# Patient Record
Sex: FEMALE | Race: WHITE | Employment: UNEMPLOYED | URBAN - METROPOLITAN AREA
[De-identification: names, ages, dates, MRNs, and addresses within clinical notes are randomized per-mention and may not be internally consistent; named-entity substitution may affect disease eponyms.]

---

## 2017-01-30 LAB
ALBUMIN SERPL-MCNC: 4 G/DL
ALP BLD-CCNC: 73 U/L
ALT SERPL-CCNC: 20 U/L
ANION GAP SERPL CALCULATED.3IONS-SCNC: 66 MMOL/L
AST SERPL-CCNC: 20 U/L
BILIRUB SERPL-MCNC: 0.4 MG/DL (ref 0.1–1.4)
BUN BLDV-MCNC: 11 MG/DL
CALCIUM SERPL-MCNC: 9.3 MG/DL
CHLORIDE BLD-SCNC: 102 MMOL/L
CHOLESTEROL, TOTAL: 195 MG/DL
CHOLESTEROL/HDL RATIO: NORMAL
CO2: 28 MMOL/L
CREAT SERPL-MCNC: 0.9 MG/DL
GFR CALCULATED: NORMAL
GLUCOSE BLD-MCNC: 107 MG/DL
HDLC SERPL-MCNC: 42 MG/DL (ref 35–70)
LDL CHOLESTEROL CALCULATED: 120 MG/DL (ref 0–160)
POTASSIUM SERPL-SCNC: 3.9 MMOL/L
SODIUM BLD-SCNC: 143 MMOL/L
TOTAL PROTEIN: 6.6
TRIGL SERPL-MCNC: 164 MG/DL
VLDLC SERPL CALC-MCNC: 33 MG/DL

## 2017-03-27 ENCOUNTER — PROCEDURE VISIT (OUTPATIENT)
Dept: CARDIOLOGY CLINIC | Age: 67
End: 2017-03-27

## 2017-03-27 DIAGNOSIS — Z95.0 CARDIAC PACEMAKER IN SITU: Primary | ICD-10-CM

## 2017-03-27 PROCEDURE — 93294 REM INTERROG EVL PM/LDLS PM: CPT | Performed by: INTERNAL MEDICINE

## 2017-03-27 PROCEDURE — 93296 REM INTERROG EVL PM/IDS: CPT | Performed by: INTERNAL MEDICINE

## 2017-06-09 RX ORDER — ATENOLOL AND CHLORTHALIDONE TABLET 50; 25 MG/1; MG/1
0.5 TABLET ORAL DAILY
Qty: 45 TABLET | Refills: 3 | Status: SHIPPED | OUTPATIENT
Start: 2017-06-09 | End: 2017-08-24 | Stop reason: SDUPTHER

## 2017-07-05 ENCOUNTER — PROCEDURE VISIT (OUTPATIENT)
Dept: CARDIOLOGY CLINIC | Age: 67
End: 2017-07-05

## 2017-07-05 DIAGNOSIS — Z95.0 CARDIAC PACEMAKER IN SITU: Primary | ICD-10-CM

## 2017-07-05 PROCEDURE — 93294 REM INTERROG EVL PM/LDLS PM: CPT | Performed by: INTERNAL MEDICINE

## 2017-07-05 PROCEDURE — 93296 REM INTERROG EVL PM/IDS: CPT | Performed by: INTERNAL MEDICINE

## 2017-08-24 ENCOUNTER — OFFICE VISIT (OUTPATIENT)
Dept: CARDIOLOGY CLINIC | Age: 67
End: 2017-08-24

## 2017-08-24 VITALS
SYSTOLIC BLOOD PRESSURE: 130 MMHG | WEIGHT: 234.4 LBS | DIASTOLIC BLOOD PRESSURE: 90 MMHG | HEART RATE: 68 BPM | BODY MASS INDEX: 37.67 KG/M2 | HEIGHT: 66 IN

## 2017-08-24 DIAGNOSIS — I47.1 PSVT (PAROXYSMAL SUPRAVENTRICULAR TACHYCARDIA) (HCC): Primary | ICD-10-CM

## 2017-08-24 DIAGNOSIS — Z95.0 PACEMAKER: ICD-10-CM

## 2017-08-24 DIAGNOSIS — I87.2 VENOUS INSUFFICIENCY: ICD-10-CM

## 2017-08-24 DIAGNOSIS — E78.2 MIXED HYPERLIPIDEMIA: ICD-10-CM

## 2017-08-24 DIAGNOSIS — I10 ESSENTIAL HYPERTENSION: ICD-10-CM

## 2017-08-24 PROCEDURE — 1090F PRES/ABSN URINE INCON ASSESS: CPT | Performed by: INTERNAL MEDICINE

## 2017-08-24 PROCEDURE — 99214 OFFICE O/P EST MOD 30 MIN: CPT | Performed by: INTERNAL MEDICINE

## 2017-08-24 PROCEDURE — 1123F ACP DISCUSS/DSCN MKR DOCD: CPT | Performed by: INTERNAL MEDICINE

## 2017-08-24 PROCEDURE — 4040F PNEUMOC VAC/ADMIN/RCVD: CPT | Performed by: INTERNAL MEDICINE

## 2017-08-24 PROCEDURE — 3014F SCREEN MAMMO DOC REV: CPT | Performed by: INTERNAL MEDICINE

## 2017-08-24 PROCEDURE — 93000 ELECTROCARDIOGRAM COMPLETE: CPT | Performed by: INTERNAL MEDICINE

## 2017-08-24 PROCEDURE — G8427 DOCREV CUR MEDS BY ELIG CLIN: HCPCS | Performed by: INTERNAL MEDICINE

## 2017-08-24 PROCEDURE — G8419 CALC BMI OUT NRM PARAM NOF/U: HCPCS | Performed by: INTERNAL MEDICINE

## 2017-08-24 PROCEDURE — 1036F TOBACCO NON-USER: CPT | Performed by: INTERNAL MEDICINE

## 2017-08-24 PROCEDURE — G8400 PT W/DXA NO RESULTS DOC: HCPCS | Performed by: INTERNAL MEDICINE

## 2017-08-24 PROCEDURE — 3017F COLORECTAL CA SCREEN DOC REV: CPT | Performed by: INTERNAL MEDICINE

## 2017-08-24 RX ORDER — VERAPAMIL HYDROCHLORIDE 120 MG/1
120 TABLET, FILM COATED ORAL DAILY
Qty: 90 TABLET | Refills: 3 | Status: SHIPPED | OUTPATIENT
Start: 2017-08-24 | End: 2019-03-13 | Stop reason: SDUPTHER

## 2017-08-24 RX ORDER — PRAVASTATIN SODIUM 80 MG/1
TABLET ORAL
Qty: 90 TABLET | Refills: 3 | Status: SHIPPED | OUTPATIENT
Start: 2017-08-24 | End: 2018-08-29 | Stop reason: SDUPTHER

## 2017-08-24 RX ORDER — MELOXICAM 15 MG/1
15 TABLET ORAL DAILY
COMMUNITY
End: 2018-08-29 | Stop reason: ALTCHOICE

## 2017-08-24 RX ORDER — ATENOLOL AND CHLORTHALIDONE TABLET 50; 25 MG/1; MG/1
0.5 TABLET ORAL DAILY
Qty: 45 TABLET | Refills: 3 | Status: SHIPPED | OUTPATIENT
Start: 2017-08-24 | End: 2018-08-29 | Stop reason: SDUPTHER

## 2017-09-21 ENCOUNTER — NURSE ONLY (OUTPATIENT)
Dept: CARDIOLOGY CLINIC | Age: 67
End: 2017-09-21

## 2017-09-21 VITALS
BODY MASS INDEX: 38.09 KG/M2 | HEIGHT: 66 IN | DIASTOLIC BLOOD PRESSURE: 68 MMHG | OXYGEN SATURATION: 97 % | SYSTOLIC BLOOD PRESSURE: 118 MMHG | HEART RATE: 59 BPM | WEIGHT: 237 LBS

## 2017-09-21 DIAGNOSIS — I10 ESSENTIAL HYPERTENSION: Primary | ICD-10-CM

## 2017-09-21 PROCEDURE — 99999 PR OFFICE/OUTPT VISIT,PROCEDURE ONLY: CPT | Performed by: INTERNAL MEDICINE

## 2017-10-09 ENCOUNTER — PROCEDURE VISIT (OUTPATIENT)
Dept: CARDIOLOGY CLINIC | Age: 67
End: 2017-10-09

## 2017-10-09 DIAGNOSIS — Z95.0 CARDIAC PACEMAKER IN SITU: Primary | ICD-10-CM

## 2017-10-09 PROCEDURE — 93296 REM INTERROG EVL PM/IDS: CPT | Performed by: INTERNAL MEDICINE

## 2017-10-09 PROCEDURE — 93294 REM INTERROG EVL PM/LDLS PM: CPT | Performed by: INTERNAL MEDICINE

## 2017-10-19 LAB
A/G RATIO: 1.5 (CALC) (ref 0.8–2.6)
ALBUMIN SERPL-MCNC: 4.2 GM/DL (ref 3.5–5.2)
ALP BLD-CCNC: 71 U/L (ref 23–144)
ALT SERPL-CCNC: 25 U/L (ref 0–60)
AST SERPL-CCNC: 22 U/L (ref 0–55)
BILIRUB SERPL-MCNC: 0.3 MG/DL (ref 0–1.2)
BUN / CREAT RATIO: 16 (CALC) (ref 7–25)
BUN BLDV-MCNC: 16 MG/DL (ref 3–29)
CALCIUM SERPL-MCNC: 9.3 MG/DL (ref 8.5–10.5)
CHLORIDE BLD-SCNC: 100 MEQ/L (ref 96–110)
CHOLESTEROL, TOTAL: 161 MG/DL
CO2: 28 MEQ/L (ref 19–32)
COPY(IES) SENT TO:: NORMAL
CREAT SERPL-MCNC: 1 MG/DL
GFR SERPL CREATININE-BSD FRML MDRD: 58 ML/MIN/1.73M2
GLOBULIN: 2.8 GM/DL (CALC) (ref 1.9–3.6)
GLUCOSE BLD-MCNC: 107 MG/DL
HDLC SERPL-MCNC: 44 MG/DL
LDL CHOLESTEROL: 79 MG/DL (CALC)
POTASSIUM SERPL-SCNC: 3.8 MEQ/L (ref 3.4–5.3)
SODIUM BLD-SCNC: 141 MEQ/L (ref 135–148)
TOTAL PROTEIN: 7 GM/DL (ref 6–8.3)
TRIGL SERPL-MCNC: 192 MG/DL
VLDLC SERPL CALC-MCNC: 38 MG/DL (CALC) (ref 4–38)

## 2018-01-15 ENCOUNTER — PROCEDURE VISIT (OUTPATIENT)
Dept: CARDIOLOGY CLINIC | Age: 68
End: 2018-01-15

## 2018-01-15 ENCOUNTER — TELEPHONE (OUTPATIENT)
Dept: CARDIOLOGY CLINIC | Age: 68
End: 2018-01-15

## 2018-01-15 DIAGNOSIS — Z95.0 CARDIAC PACEMAKER IN SITU: Primary | ICD-10-CM

## 2018-01-15 PROCEDURE — 93294 REM INTERROG EVL PM/LDLS PM: CPT | Performed by: INTERNAL MEDICINE

## 2018-01-15 PROCEDURE — 93296 REM INTERROG EVL PM/IDS: CPT | Performed by: INTERNAL MEDICINE

## 2018-02-23 RX ORDER — FLECAINIDE ACETATE 150 MG/1
150 TABLET ORAL 2 TIMES DAILY
Qty: 180 TABLET | Refills: 3 | Status: SHIPPED | OUTPATIENT
Start: 2018-02-23 | End: 2019-03-13 | Stop reason: SDUPTHER

## 2018-03-01 ENCOUNTER — TELEPHONE (OUTPATIENT)
Dept: CARDIOLOGY CLINIC | Age: 68
End: 2018-03-01

## 2018-03-08 ENCOUNTER — TELEPHONE (OUTPATIENT)
Dept: CARDIOLOGY CLINIC | Age: 68
End: 2018-03-08

## 2018-03-08 NOTE — TELEPHONE ENCOUNTER
Patient called stating flecainide is generic at $175.00. Dr. Alyce Longoria said she may need to shop around it is cheaper other places.

## 2018-04-24 ENCOUNTER — PROCEDURE VISIT (OUTPATIENT)
Dept: CARDIOLOGY CLINIC | Age: 68
End: 2018-04-24

## 2018-04-24 DIAGNOSIS — Z95.0 CARDIAC PACEMAKER IN SITU: Primary | ICD-10-CM

## 2018-04-24 PROCEDURE — 93296 REM INTERROG EVL PM/IDS: CPT | Performed by: INTERNAL MEDICINE

## 2018-04-24 PROCEDURE — 93294 REM INTERROG EVL PM/LDLS PM: CPT | Performed by: INTERNAL MEDICINE

## 2018-07-30 ENCOUNTER — PROCEDURE VISIT (OUTPATIENT)
Dept: CARDIOLOGY CLINIC | Age: 68
End: 2018-07-30

## 2018-07-30 DIAGNOSIS — Z95.0 CARDIAC PACEMAKER IN SITU: Primary | ICD-10-CM

## 2018-07-30 PROCEDURE — 93296 REM INTERROG EVL PM/IDS: CPT | Performed by: INTERNAL MEDICINE

## 2018-07-30 PROCEDURE — 93294 REM INTERROG EVL PM/LDLS PM: CPT | Performed by: INTERNAL MEDICINE

## 2018-08-29 ENCOUNTER — OFFICE VISIT (OUTPATIENT)
Dept: CARDIOLOGY CLINIC | Age: 68
End: 2018-08-29

## 2018-08-29 VITALS
WEIGHT: 228.6 LBS | DIASTOLIC BLOOD PRESSURE: 88 MMHG | HEART RATE: 60 BPM | SYSTOLIC BLOOD PRESSURE: 138 MMHG | BODY MASS INDEX: 36.74 KG/M2 | HEIGHT: 66 IN

## 2018-08-29 DIAGNOSIS — Z95.0 PACEMAKER: Primary | ICD-10-CM

## 2018-08-29 DIAGNOSIS — I10 ESSENTIAL HYPERTENSION: ICD-10-CM

## 2018-08-29 DIAGNOSIS — I47.1 PSVT (PAROXYSMAL SUPRAVENTRICULAR TACHYCARDIA) (HCC): ICD-10-CM

## 2018-08-29 DIAGNOSIS — E78.2 MIXED HYPERLIPIDEMIA: ICD-10-CM

## 2018-08-29 PROCEDURE — 1090F PRES/ABSN URINE INCON ASSESS: CPT | Performed by: INTERNAL MEDICINE

## 2018-08-29 PROCEDURE — 93000 ELECTROCARDIOGRAM COMPLETE: CPT | Performed by: INTERNAL MEDICINE

## 2018-08-29 PROCEDURE — 3017F COLORECTAL CA SCREEN DOC REV: CPT | Performed by: INTERNAL MEDICINE

## 2018-08-29 PROCEDURE — 99214 OFFICE O/P EST MOD 30 MIN: CPT | Performed by: INTERNAL MEDICINE

## 2018-08-29 PROCEDURE — G8427 DOCREV CUR MEDS BY ELIG CLIN: HCPCS | Performed by: INTERNAL MEDICINE

## 2018-08-29 PROCEDURE — G8400 PT W/DXA NO RESULTS DOC: HCPCS | Performed by: INTERNAL MEDICINE

## 2018-08-29 PROCEDURE — 1123F ACP DISCUSS/DSCN MKR DOCD: CPT | Performed by: INTERNAL MEDICINE

## 2018-08-29 PROCEDURE — 1036F TOBACCO NON-USER: CPT | Performed by: INTERNAL MEDICINE

## 2018-08-29 PROCEDURE — 1101F PT FALLS ASSESS-DOCD LE1/YR: CPT | Performed by: INTERNAL MEDICINE

## 2018-08-29 PROCEDURE — 4040F PNEUMOC VAC/ADMIN/RCVD: CPT | Performed by: INTERNAL MEDICINE

## 2018-08-29 PROCEDURE — G8417 CALC BMI ABV UP PARAM F/U: HCPCS | Performed by: INTERNAL MEDICINE

## 2018-08-29 RX ORDER — ALLOPURINOL 300 MG/1
300 TABLET ORAL DAILY
COMMUNITY

## 2018-08-29 RX ORDER — OMEPRAZOLE 40 MG/1
40 CAPSULE, DELAYED RELEASE ORAL DAILY
COMMUNITY

## 2018-08-29 RX ORDER — BIOTIN 1 MG
1 TABLET ORAL DAILY
COMMUNITY
End: 2019-09-18

## 2018-08-29 RX ORDER — ATENOLOL AND CHLORTHALIDONE TABLET 50; 25 MG/1; MG/1
0.5 TABLET ORAL DAILY
Qty: 45 TABLET | Refills: 3 | Status: SHIPPED | OUTPATIENT
Start: 2018-08-29 | End: 2019-03-13 | Stop reason: SDUPTHER

## 2018-08-29 RX ORDER — NAPROXEN 500 MG/1
500 TABLET ORAL PRN
COMMUNITY

## 2018-08-29 RX ORDER — PRAVASTATIN SODIUM 80 MG/1
TABLET ORAL
Qty: 90 TABLET | Refills: 3 | Status: SHIPPED | OUTPATIENT
Start: 2018-08-29 | End: 2019-03-13 | Stop reason: SDUPTHER

## 2018-11-06 ENCOUNTER — PROCEDURE VISIT (OUTPATIENT)
Dept: CARDIOLOGY CLINIC | Age: 68
End: 2018-11-06
Payer: MEDICARE

## 2018-11-06 DIAGNOSIS — Z95.0 CARDIAC PACEMAKER IN SITU: Primary | ICD-10-CM

## 2018-11-06 PROCEDURE — 93294 REM INTERROG EVL PM/LDLS PM: CPT | Performed by: INTERNAL MEDICINE

## 2018-11-06 PROCEDURE — 93296 REM INTERROG EVL PM/IDS: CPT | Performed by: INTERNAL MEDICINE

## 2019-02-11 ENCOUNTER — TELEPHONE (OUTPATIENT)
Dept: CARDIOLOGY CLINIC | Age: 69
End: 2019-02-11

## 2019-02-11 ENCOUNTER — PROCEDURE VISIT (OUTPATIENT)
Dept: CARDIOLOGY CLINIC | Age: 69
End: 2019-02-11
Payer: MEDICARE

## 2019-02-11 DIAGNOSIS — Z95.0 CARDIAC PACEMAKER IN SITU: Primary | ICD-10-CM

## 2019-02-11 PROCEDURE — 93294 REM INTERROG EVL PM/LDLS PM: CPT | Performed by: INTERNAL MEDICINE

## 2019-02-11 PROCEDURE — 93296 REM INTERROG EVL PM/IDS: CPT | Performed by: INTERNAL MEDICINE

## 2019-02-12 LAB
ALBUMIN SERPL-MCNC: 4.6 G/DL
ALP BLD-CCNC: NORMAL U/L
ALT SERPL-CCNC: NORMAL U/L
ANION GAP SERPL CALCULATED.3IONS-SCNC: NORMAL MMOL/L
AST SERPL-CCNC: NORMAL U/L
BILIRUB SERPL-MCNC: NORMAL MG/DL (ref 0.1–1.4)
BUN BLDV-MCNC: 14 MG/DL
CALCIUM SERPL-MCNC: 9.8 MG/DL
CHLORIDE BLD-SCNC: 101 MMOL/L
CHOLESTEROL, TOTAL: 141 MG/DL
CHOLESTEROL/HDL RATIO: NORMAL
CO2: 32 MMOL/L
CREAT SERPL-MCNC: 1 MG/DL
GFR CALCULATED: NORMAL
GLUCOSE BLD-MCNC: 111 MG/DL
HDLC SERPL-MCNC: 44 MG/DL (ref 35–70)
LDL CHOLESTEROL CALCULATED: 67 MG/DL (ref 0–160)
POTASSIUM SERPL-SCNC: 3.4 MMOL/L
SODIUM BLD-SCNC: 142 MMOL/L
T4 FREE: 1.3
TOTAL PROTEIN: 6.8
TRIGL SERPL-MCNC: 149 MG/DL
TSH SERPL DL<=0.05 MIU/L-ACNC: 2.9 UIU/ML
URIC ACID: 5.8
VLDLC SERPL CALC-MCNC: 30 MG/DL

## 2019-03-13 ENCOUNTER — OFFICE VISIT (OUTPATIENT)
Dept: CARDIOLOGY CLINIC | Age: 69
End: 2019-03-13
Payer: MEDICARE

## 2019-03-13 VITALS
HEIGHT: 66 IN | SYSTOLIC BLOOD PRESSURE: 150 MMHG | WEIGHT: 234.8 LBS | HEART RATE: 64 BPM | DIASTOLIC BLOOD PRESSURE: 84 MMHG | BODY MASS INDEX: 37.73 KG/M2

## 2019-03-13 DIAGNOSIS — I10 ESSENTIAL HYPERTENSION: ICD-10-CM

## 2019-03-13 DIAGNOSIS — I20.8 ANGINA OF EFFORT (HCC): ICD-10-CM

## 2019-03-13 DIAGNOSIS — E78.2 MIXED HYPERLIPIDEMIA: ICD-10-CM

## 2019-03-13 DIAGNOSIS — I47.1 PSVT (PAROXYSMAL SUPRAVENTRICULAR TACHYCARDIA) (HCC): ICD-10-CM

## 2019-03-13 DIAGNOSIS — Z95.0 PACEMAKER: Primary | ICD-10-CM

## 2019-03-13 PROBLEM — I20.89 ANGINA OF EFFORT: Status: ACTIVE | Noted: 2019-03-13

## 2019-03-13 PROCEDURE — 4040F PNEUMOC VAC/ADMIN/RCVD: CPT | Performed by: INTERNAL MEDICINE

## 2019-03-13 PROCEDURE — 1101F PT FALLS ASSESS-DOCD LE1/YR: CPT | Performed by: INTERNAL MEDICINE

## 2019-03-13 PROCEDURE — G8484 FLU IMMUNIZE NO ADMIN: HCPCS | Performed by: INTERNAL MEDICINE

## 2019-03-13 PROCEDURE — G8417 CALC BMI ABV UP PARAM F/U: HCPCS | Performed by: INTERNAL MEDICINE

## 2019-03-13 PROCEDURE — 1036F TOBACCO NON-USER: CPT | Performed by: INTERNAL MEDICINE

## 2019-03-13 PROCEDURE — 1090F PRES/ABSN URINE INCON ASSESS: CPT | Performed by: INTERNAL MEDICINE

## 2019-03-13 PROCEDURE — G8427 DOCREV CUR MEDS BY ELIG CLIN: HCPCS | Performed by: INTERNAL MEDICINE

## 2019-03-13 PROCEDURE — 3017F COLORECTAL CA SCREEN DOC REV: CPT | Performed by: INTERNAL MEDICINE

## 2019-03-13 PROCEDURE — G8400 PT W/DXA NO RESULTS DOC: HCPCS | Performed by: INTERNAL MEDICINE

## 2019-03-13 PROCEDURE — 99214 OFFICE O/P EST MOD 30 MIN: CPT | Performed by: INTERNAL MEDICINE

## 2019-03-13 PROCEDURE — 1123F ACP DISCUSS/DSCN MKR DOCD: CPT | Performed by: INTERNAL MEDICINE

## 2019-03-13 PROCEDURE — G8599 NO ASA/ANTIPLAT THER USE RNG: HCPCS | Performed by: INTERNAL MEDICINE

## 2019-03-13 RX ORDER — FLECAINIDE ACETATE 150 MG/1
150 TABLET ORAL 2 TIMES DAILY
Qty: 180 TABLET | Refills: 2 | Status: SHIPPED | OUTPATIENT
Start: 2019-03-13 | End: 2020-01-22 | Stop reason: SDUPTHER

## 2019-03-13 RX ORDER — VERAPAMIL HYDROCHLORIDE 120 MG/1
120 TABLET, FILM COATED ORAL 2 TIMES DAILY
Qty: 180 TABLET | Refills: 2 | Status: SHIPPED | OUTPATIENT
Start: 2019-03-13 | End: 2019-06-13

## 2019-03-13 RX ORDER — ATENOLOL AND CHLORTHALIDONE TABLET 50; 25 MG/1; MG/1
0.5 TABLET ORAL DAILY
Qty: 45 TABLET | Refills: 2 | Status: SHIPPED | OUTPATIENT
Start: 2019-03-13 | End: 2020-01-22 | Stop reason: SDUPTHER

## 2019-03-13 RX ORDER — PRAVASTATIN SODIUM 80 MG/1
TABLET ORAL
Qty: 90 TABLET | Refills: 2 | Status: SHIPPED | OUTPATIENT
Start: 2019-03-13 | End: 2020-01-22 | Stop reason: SDUPTHER

## 2019-03-13 RX ORDER — TURMERIC/TURMERIC EXT/PEPR EXT 900-100 MG
CAPSULE ORAL
COMMUNITY
End: 2019-09-18

## 2019-03-15 ENCOUNTER — TELEPHONE (OUTPATIENT)
Dept: CARDIOLOGY CLINIC | Age: 69
End: 2019-03-15

## 2019-03-19 ENCOUNTER — PROCEDURE VISIT (OUTPATIENT)
Dept: CARDIOLOGY CLINIC | Age: 69
End: 2019-03-19
Payer: MEDICARE

## 2019-03-19 DIAGNOSIS — I20.8 ANGINA OF EFFORT (HCC): ICD-10-CM

## 2019-03-19 DIAGNOSIS — R06.02 SHORTNESS OF BREATH: Primary | ICD-10-CM

## 2019-03-19 LAB
LV EF: 64 %
LVEF MODALITY: NORMAL

## 2019-03-19 PROCEDURE — 93018 CV STRESS TEST I&R ONLY: CPT | Performed by: INTERNAL MEDICINE

## 2019-03-19 PROCEDURE — 93017 CV STRESS TEST TRACING ONLY: CPT | Performed by: INTERNAL MEDICINE

## 2019-03-19 PROCEDURE — A9500 TC99M SESTAMIBI: HCPCS | Performed by: INTERNAL MEDICINE

## 2019-03-19 PROCEDURE — 78452 HT MUSCLE IMAGE SPECT MULT: CPT | Performed by: INTERNAL MEDICINE

## 2019-03-19 PROCEDURE — 93016 CV STRESS TEST SUPVJ ONLY: CPT | Performed by: INTERNAL MEDICINE

## 2019-03-20 ENCOUNTER — TELEPHONE (OUTPATIENT)
Dept: CARDIOLOGY CLINIC | Age: 69
End: 2019-03-20

## 2019-03-20 NOTE — TELEPHONE ENCOUNTER
Patient called she was called with normal test results   She wants to know what the next step is   She is worried about the feeling she is having   Feels like the oxygen is being pulled from  The back of her shoulder blades

## 2019-03-21 NOTE — TELEPHONE ENCOUNTER
I spoke to patient and discussed the symptoms. Her symptoms are vague and lasting only for a few seconds and are unpredictable. I advised her to follow-up with primary care physician to see if she could be referred to a neurologist for neuro workup as some of these symptoms she is having could be related to neuropathy related paresthesias.

## 2019-05-10 ENCOUNTER — TELEPHONE (OUTPATIENT)
Dept: CARDIOLOGY CLINIC | Age: 69
End: 2019-05-10

## 2019-05-16 ENCOUNTER — TELEPHONE (OUTPATIENT)
Dept: CARDIOLOGY CLINIC | Age: 69
End: 2019-05-16

## 2019-05-16 RX ORDER — AMLODIPINE BESYLATE 5 MG/1
5 TABLET ORAL DAILY
Qty: 90 TABLET | Refills: 1 | Status: SHIPPED | OUTPATIENT
Start: 2019-05-16 | End: 2019-09-18 | Stop reason: SDUPTHER

## 2019-05-16 NOTE — TELEPHONE ENCOUNTER
Pt called about [facility] script not being at pharmacy. Spoke with hardeep and she has verified that the dr beauchamp finished his side of script. She will go to him and remind him. Pt understood that it should be there tonight and will pick it up on Saturday. No call needed to pt.

## 2019-05-20 ENCOUNTER — PROCEDURE VISIT (OUTPATIENT)
Dept: CARDIOLOGY CLINIC | Age: 69
End: 2019-05-20
Payer: MEDICARE

## 2019-05-20 DIAGNOSIS — Z95.0 CARDIAC PACEMAKER IN SITU: Primary | ICD-10-CM

## 2019-05-20 PROCEDURE — 93294 REM INTERROG EVL PM/LDLS PM: CPT | Performed by: INTERNAL MEDICINE

## 2019-05-20 PROCEDURE — 93296 REM INTERROG EVL PM/IDS: CPT | Performed by: INTERNAL MEDICINE

## 2019-06-13 ENCOUNTER — NURSE ONLY (OUTPATIENT)
Dept: CARDIOLOGY CLINIC | Age: 69
End: 2019-06-13

## 2019-06-13 VITALS
DIASTOLIC BLOOD PRESSURE: 82 MMHG | SYSTOLIC BLOOD PRESSURE: 142 MMHG | WEIGHT: 237.6 LBS | HEIGHT: 66 IN | BODY MASS INDEX: 38.18 KG/M2 | HEART RATE: 62 BPM

## 2019-06-13 DIAGNOSIS — I10 ESSENTIAL HYPERTENSION: Primary | ICD-10-CM

## 2019-08-27 ENCOUNTER — PROCEDURE VISIT (OUTPATIENT)
Dept: CARDIOLOGY CLINIC | Age: 69
End: 2019-08-27
Payer: MEDICARE

## 2019-08-27 DIAGNOSIS — Z95.0 CARDIAC PACEMAKER IN SITU: Primary | ICD-10-CM

## 2019-08-27 PROCEDURE — 93296 REM INTERROG EVL PM/IDS: CPT | Performed by: INTERNAL MEDICINE

## 2019-08-27 PROCEDURE — 93294 REM INTERROG EVL PM/LDLS PM: CPT | Performed by: INTERNAL MEDICINE

## 2019-09-18 ENCOUNTER — OFFICE VISIT (OUTPATIENT)
Dept: CARDIOLOGY CLINIC | Age: 69
End: 2019-09-18
Payer: MEDICARE

## 2019-09-18 VITALS
HEIGHT: 66 IN | DIASTOLIC BLOOD PRESSURE: 86 MMHG | SYSTOLIC BLOOD PRESSURE: 136 MMHG | BODY MASS INDEX: 38.57 KG/M2 | HEART RATE: 60 BPM | WEIGHT: 240 LBS

## 2019-09-18 DIAGNOSIS — Z95.0 PACEMAKER: ICD-10-CM

## 2019-09-18 DIAGNOSIS — E78.2 MIXED HYPERLIPIDEMIA: Primary | ICD-10-CM

## 2019-09-18 DIAGNOSIS — I10 ESSENTIAL HYPERTENSION: ICD-10-CM

## 2019-09-18 PROCEDURE — 99214 OFFICE O/P EST MOD 30 MIN: CPT | Performed by: INTERNAL MEDICINE

## 2019-09-18 PROCEDURE — 1123F ACP DISCUSS/DSCN MKR DOCD: CPT | Performed by: INTERNAL MEDICINE

## 2019-09-18 PROCEDURE — 3017F COLORECTAL CA SCREEN DOC REV: CPT | Performed by: INTERNAL MEDICINE

## 2019-09-18 PROCEDURE — 1036F TOBACCO NON-USER: CPT | Performed by: INTERNAL MEDICINE

## 2019-09-18 PROCEDURE — 1090F PRES/ABSN URINE INCON ASSESS: CPT | Performed by: INTERNAL MEDICINE

## 2019-09-18 PROCEDURE — 4040F PNEUMOC VAC/ADMIN/RCVD: CPT | Performed by: INTERNAL MEDICINE

## 2019-09-18 PROCEDURE — G8427 DOCREV CUR MEDS BY ELIG CLIN: HCPCS | Performed by: INTERNAL MEDICINE

## 2019-09-18 PROCEDURE — G8417 CALC BMI ABV UP PARAM F/U: HCPCS | Performed by: INTERNAL MEDICINE

## 2019-09-18 PROCEDURE — G8400 PT W/DXA NO RESULTS DOC: HCPCS | Performed by: INTERNAL MEDICINE

## 2019-09-18 PROCEDURE — G8599 NO ASA/ANTIPLAT THER USE RNG: HCPCS | Performed by: INTERNAL MEDICINE

## 2019-09-18 RX ORDER — AMLODIPINE BESYLATE 5 MG/1
5 TABLET ORAL DAILY
Qty: 90 TABLET | Refills: 3 | Status: SHIPPED | OUTPATIENT
Start: 2019-09-18 | End: 2020-01-22 | Stop reason: SDUPTHER

## 2019-09-18 NOTE — PROGRESS NOTES
MG tablet Take 1 tablet by mouth daily 9/18/19  Yes Author MD Rommel   atenolol-chlorthalidone [facility]) 50-25 MG per tablet Take 0.5 tablets by mouth daily 3/13/19  Yes Author MD Rommel   pravastatin (PRAVACHOL) 80 MG tablet One daily 3/13/19  Yes Author MD Rommel   flecainide [facility] [place]) 150 MG tablet Take 1 tablet by mouth 2 times daily 3/13/19  Yes Author MD Rommel   omeprazole (PRILOSEC) 40 MG delayed release capsule Take 40 mg by mouth daily   Yes Historical Provider, MD   naproxen (NAPROSYN) 500 MG tablet Take 500 mg by mouth as needed for Pain   Yes Historical Provider, MD   allopurinol (ZYLOPRIM) 300 MG tablet Take 300 mg by mouth daily   Yes Historical Provider, MD   Multiple Vitamin (MULTIVITAMIN PO) Take  by mouth daily. Yes Historical Provider, MD     Vitals:    09/18/19 1151   BP: 136/86   Pulse: 60   Weight: 240 lb (108.9 kg)   Height: 5' 6\" (1.676 m)      Body mass index is 38.74 kg/m². Wt Readings from Last 3 Encounters:   09/18/19 240 lb (108.9 kg)   06/13/19 237 lb 9.6 oz (107.8 kg)   03/13/19 234 lb 12.8 oz (106.5 kg)     Constitutional: Patient is moderately obese pleasant female in no apparent distress. Weight is up by 6 pounds since last visit. Eyes: Pupils are equal in both eyes.  She wears glasses. NECK: No JVP or thyromegaly  Cardiovascular:  Auscultation: Normal S1 and S2.  No significant murmurs. Carotids negative for bruits.  Abdominal aorta is nonpalpable.  No epigastric bruit noted. Peripheral pulses: 1+ equal in both feet. Respiratory:  Respiratory effort is normal. Breath sounds are clear to auscultation. Extremities:  No edema, clubbing,  Cyanosis, petechiae. SKIN: Warm and well perfused, no pallor or cyanosis  Abdomen:  No masses or tenderness. No organomegaly noted. Musculoskeletal:  No spinal deformities noted.  Gait is normal.  Muscle strength is normal.  Left-sided pacemaker pocket is intact.     Neurologic:  Oriented to time, place, and person and

## 2019-09-18 NOTE — PATIENT INSTRUCTIONS
Bring the BP monitor for nurse check. Continue current medications. Salt restriction. Counseled to lose weight. Continue device check as per care link schedule. Appropriate prescriptions if needed on this visit are addressed. After visit summery is provided. Questions answered and patient verbalizes understanding. Follow up in office in 6 months, sooner if needed.

## 2019-10-16 ENCOUNTER — OFFICE VISIT (OUTPATIENT)
Dept: CARDIOLOGY CLINIC | Age: 69
End: 2019-10-16

## 2019-10-16 VITALS
DIASTOLIC BLOOD PRESSURE: 88 MMHG | HEART RATE: 67 BPM | BODY MASS INDEX: 38.7 KG/M2 | WEIGHT: 240.8 LBS | SYSTOLIC BLOOD PRESSURE: 138 MMHG | HEIGHT: 66 IN

## 2019-10-16 DIAGNOSIS — I10 ESSENTIAL HYPERTENSION: Primary | ICD-10-CM

## 2019-12-05 ENCOUNTER — PROCEDURE VISIT (OUTPATIENT)
Dept: CARDIOLOGY CLINIC | Age: 69
End: 2019-12-05
Payer: MEDICARE

## 2019-12-05 DIAGNOSIS — Z95.0 CARDIAC PACEMAKER IN SITU: Primary | ICD-10-CM

## 2019-12-05 PROCEDURE — 93296 REM INTERROG EVL PM/IDS: CPT | Performed by: INTERNAL MEDICINE

## 2019-12-05 PROCEDURE — 93294 REM INTERROG EVL PM/LDLS PM: CPT | Performed by: INTERNAL MEDICINE

## 2020-01-14 LAB
BASOPHILS ABSOLUTE: 0 /ΜL
BASOPHILS RELATIVE PERCENT: 0 %
BUN BLDV-MCNC: 16 MG/DL
CALCIUM SERPL-MCNC: 9.7 MG/DL
CHLORIDE BLD-SCNC: 101 MMOL/L
CHOLESTEROL, TOTAL: 166 MG/DL
CHOLESTEROL/HDL RATIO: NORMAL
CO2: 26 MMOL/L
CREAT SERPL-MCNC: 1.06 MG/DL
EOSINOPHILS ABSOLUTE: 0.3 /ΜL
EOSINOPHILS RELATIVE PERCENT: 4 %
GFR CALCULATED: NORMAL
GLUCOSE BLD-MCNC: 121 MG/DL
HCT VFR BLD CALC: 40.7 % (ref 36–46)
HDLC SERPL-MCNC: 44 MG/DL (ref 35–70)
HEMOGLOBIN: 13.5 G/DL (ref 12–16)
LDL CHOLESTEROL CALCULATED: 88 MG/DL (ref 0–160)
LYMPHOCYTES ABSOLUTE: 1.7 /ΜL
LYMPHOCYTES RELATIVE PERCENT: 22 %
MCH RBC QN AUTO: 29.8 PG
MCHC RBC AUTO-ENTMCNC: 33.2 G/DL
MCV RBC AUTO: 90 FL
MONOCYTES ABSOLUTE: 0.4 /ΜL
MONOCYTES RELATIVE PERCENT: 6 %
NEUTROPHILS ABSOLUTE: 5.2 /ΜL
NEUTROPHILS RELATIVE PERCENT: 68 %
PLATELET # BLD: 343 K/ΜL
PMV BLD AUTO: NORMAL FL
POTASSIUM SERPL-SCNC: 3.8 MMOL/L
RBC # BLD: 4.53 10^6/ΜL
SODIUM BLD-SCNC: 142 MMOL/L
T4 FREE: 1.22
TRIGL SERPL-MCNC: 172 MG/DL
TSH SERPL DL<=0.05 MIU/L-ACNC: 2.98 UIU/ML
VLDLC SERPL CALC-MCNC: 34 MG/DL
WBC # BLD: 7.6 10^3/ML

## 2020-01-15 LAB
CHOLESTEROL, TOTAL: 166 MG/DL
CHOLESTEROL/HDL RATIO: NORMAL
HDLC SERPL-MCNC: 44 MG/DL (ref 35–70)
LDL CHOLESTEROL CALCULATED: 88 MG/DL (ref 0–160)
TRIGL SERPL-MCNC: 172 MG/DL
VLDLC SERPL CALC-MCNC: 34 MG/DL

## 2020-01-22 RX ORDER — ATENOLOL AND CHLORTHALIDONE TABLET 50; 25 MG/1; MG/1
0.5 TABLET ORAL DAILY
Qty: 45 TABLET | Refills: 3 | Status: SHIPPED | OUTPATIENT
Start: 2020-01-22 | End: 2021-01-13 | Stop reason: SDUPTHER

## 2020-01-22 RX ORDER — PRAVASTATIN SODIUM 80 MG/1
TABLET ORAL
Qty: 90 TABLET | Refills: 3 | Status: SHIPPED | OUTPATIENT
Start: 2020-01-22 | End: 2021-05-18 | Stop reason: SDUPTHER

## 2020-01-22 RX ORDER — FLECAINIDE ACETATE 150 MG/1
150 TABLET ORAL 2 TIMES DAILY
Qty: 180 TABLET | Refills: 3 | Status: SHIPPED | OUTPATIENT
Start: 2020-01-22 | End: 2020-12-17 | Stop reason: SDUPTHER

## 2020-01-22 RX ORDER — AMLODIPINE BESYLATE 5 MG/1
5 TABLET ORAL DAILY
Qty: 90 TABLET | Refills: 3 | Status: SHIPPED | OUTPATIENT
Start: 2020-01-22 | End: 2020-06-25 | Stop reason: SDUPTHER

## 2020-01-22 RX ORDER — FLECAINIDE ACETATE 150 MG/1
150 TABLET ORAL 2 TIMES DAILY
Qty: 20 TABLET | Refills: 0 | Status: SHIPPED
Start: 2020-01-22 | End: 2020-06-25 | Stop reason: SDUPTHER

## 2020-01-22 NOTE — TELEPHONE ENCOUNTER
Change pharm to [facility] [phone] pt is out of all. Needs ASAP was called to [facility] [address]. Pt will call them and have them give her one week.

## 2020-01-27 ENCOUNTER — TELEPHONE (OUTPATIENT)
Dept: CARDIOLOGY CLINIC | Age: 70
End: 2020-01-27

## 2020-03-10 ENCOUNTER — PROCEDURE VISIT (OUTPATIENT)
Dept: CARDIOLOGY CLINIC | Age: 70
End: 2020-03-10
Payer: MEDICARE

## 2020-03-10 ENCOUNTER — TELEPHONE (OUTPATIENT)
Dept: CARDIOLOGY CLINIC | Age: 70
End: 2020-03-10

## 2020-03-10 PROCEDURE — 93296 REM INTERROG EVL PM/IDS: CPT | Performed by: INTERNAL MEDICINE

## 2020-03-10 PROCEDURE — 93294 REM INTERROG EVL PM/LDLS PM: CPT | Performed by: INTERNAL MEDICINE

## 2020-03-23 ENCOUNTER — TELEPHONE (OUTPATIENT)
Dept: CARDIOLOGY CLINIC | Age: 70
End: 2020-03-23

## 2020-03-23 NOTE — TELEPHONE ENCOUNTER
Returned call and advised patient that as of her last check in march 2020 she has 4 years left on her pacemaker battery. She thanked me for calling her back.

## 2020-06-17 ENCOUNTER — PROCEDURE VISIT (OUTPATIENT)
Dept: CARDIOLOGY CLINIC | Age: 70
End: 2020-06-17
Payer: COMMERCIAL

## 2020-06-17 PROCEDURE — 93294 REM INTERROG EVL PM/LDLS PM: CPT | Performed by: INTERNAL MEDICINE

## 2020-06-17 PROCEDURE — 93296 REM INTERROG EVL PM/IDS: CPT | Performed by: INTERNAL MEDICINE

## 2020-06-25 ENCOUNTER — TELEMEDICINE (OUTPATIENT)
Dept: CARDIOLOGY CLINIC | Age: 70
End: 2020-06-25
Payer: COMMERCIAL

## 2020-06-25 PROCEDURE — 99214 OFFICE O/P EST MOD 30 MIN: CPT | Performed by: INTERNAL MEDICINE

## 2020-06-25 RX ORDER — AMLODIPINE BESYLATE 10 MG/1
10 TABLET ORAL DAILY
Qty: 90 TABLET | Refills: 3 | Status: SHIPPED | OUTPATIENT
Start: 2020-06-25 | End: 2021-01-13 | Stop reason: SDUPTHER

## 2020-06-25 NOTE — PROGRESS NOTES
2020    TELEHEALTH EVALUATION -- Audio/Visual (During GCZ-12 public health emergency)    HPI:  Della Sims (:  1950) has requested an audio/video evaluation for hypertension hyperlipidemia and she has permanent pacemaker. She denies any chest pains. She had swelling in her feet. She denies any chest pain or shortness of breath. Denies any palpitations syncope or near-syncope. Her blood pressure has been running high and PCP has increased amlodipine to 10 mg a day and it is doing better. She has been compliant to her medications. She does not smoke. Prior to Visit Medications    Medication Sig Taking? Authorizing Provider   amLODIPine (NORVASC) 10 MG tablet Take 1 tablet by mouth daily Yes Fay Closs, MD   atenolol-chlorthalidone (TENORETIC) 50-25 MG per tablet Take 0.5 tablets by mouth daily Yes Fay Closs, MD   flecainide (TAMBOCOR) 150 MG tablet Take 1 tablet by mouth 2 times daily Yes Fay Closs, MD   pravastatin (PRAVACHOL) 80 MG tablet One daily Yes Fay Closs, MD   omeprazole (PRILOSEC) 40 MG delayed release capsule Take 40 mg by mouth daily Yes Historical Provider, MD   naproxen (NAPROSYN) 500 MG tablet Take 500 mg by mouth as needed for Pain Yes Historical Provider, MD   allopurinol (ZYLOPRIM) 300 MG tablet Take 300 mg by mouth daily Yes Historical Provider, MD   Multiple Vitamin (MULTIVITAMIN PO) Take  by mouth daily.    Yes Historical Provider, MD     Social History     Tobacco Use    Smoking status: Never Smoker    Smokeless tobacco: Never Used   Substance Use Topics    Alcohol use: No     Alcohol/week: 0.0 standard drinks     PHYSICAL EXAMINATION:  [ INSTRUCTIONS:  \"[x]\" Indicates a positive item  \"[]\" Indicates a negative item  -- DELETE ALL ITEMS NOT EXAMINED]  Vital Signs: (As obtained by patient/caregiver or practitioner observation)  Patient-Reported Vitals 2020   Patient-Reported Systolic 133   Patient-Reported Diastolic 74   Patient-Reported

## 2020-07-22 ENCOUNTER — TELEPHONE (OUTPATIENT)
Dept: CARDIOLOGY CLINIC | Age: 70
End: 2020-07-22

## 2020-07-23 ENCOUNTER — PROCEDURE VISIT (OUTPATIENT)
Dept: CARDIOLOGY CLINIC | Age: 70
End: 2020-07-23
Payer: COMMERCIAL

## 2020-07-23 PROCEDURE — 93970 EXTREMITY STUDY: CPT | Performed by: INTERNAL MEDICINE

## 2020-07-24 ENCOUNTER — TELEPHONE (OUTPATIENT)
Dept: CARDIOLOGY CLINIC | Age: 70
End: 2020-07-24

## 2020-07-24 NOTE — TELEPHONE ENCOUNTER
Spoke with patient regarding testing results. Patient voiced understanding.      Conclusions          Summary          No evidence of significant venous insufficiency noted in the bilateral lower     extremities .     No evidence of DVT or SVT in the bilateral common femoral vein, femoral     vein, popliteal vein, greater saphenous vein or small saphenous vein.          Signature

## 2020-09-20 PROCEDURE — 93294 REM INTERROG EVL PM/LDLS PM: CPT | Performed by: INTERNAL MEDICINE

## 2020-09-20 PROCEDURE — 93296 REM INTERROG EVL PM/IDS: CPT | Performed by: INTERNAL MEDICINE

## 2020-09-22 ENCOUNTER — PROCEDURE VISIT (OUTPATIENT)
Dept: CARDIOLOGY CLINIC | Age: 70
End: 2020-09-22
Payer: COMMERCIAL

## 2020-12-17 RX ORDER — FLECAINIDE ACETATE 150 MG/1
150 TABLET ORAL 2 TIMES DAILY
Qty: 180 TABLET | Refills: 3 | Status: SHIPPED | OUTPATIENT
Start: 2020-12-17 | End: 2021-03-19 | Stop reason: SDUPTHER

## 2020-12-22 RX ORDER — FLECAINIDE ACETATE 150 MG/1
150 TABLET ORAL 2 TIMES DAILY
Qty: 180 TABLET | Refills: 3 | OUTPATIENT
Start: 2020-12-22

## 2021-01-08 ENCOUNTER — PROCEDURE VISIT (OUTPATIENT)
Dept: CARDIOLOGY CLINIC | Age: 71
End: 2021-01-08
Payer: COMMERCIAL

## 2021-01-08 ENCOUNTER — TELEPHONE (OUTPATIENT)
Dept: CARDIOLOGY CLINIC | Age: 71
End: 2021-01-08

## 2021-01-08 DIAGNOSIS — Z95.0 CARDIAC PACEMAKER IN SITU: Primary | ICD-10-CM

## 2021-01-08 PROCEDURE — 93294 REM INTERROG EVL PM/LDLS PM: CPT | Performed by: INTERNAL MEDICINE

## 2021-01-08 PROCEDURE — 93296 REM INTERROG EVL PM/IDS: CPT | Performed by: INTERNAL MEDICINE

## 2021-01-08 NOTE — LETTER
Jaquan [address]  [address] [address]  Phone: [phone]  Fax: [phone]            January 8, 2021    [place] [id number]      Dear Johanna Reyes: This is your CARELINK schedule. Please edilma your calendar with these dates. You can do your checks anytime during the scheduled day. Since we do not do reminder calls, it will be your responsibility to perform the checks on the day it is scheduled. If you have any questions or concerns, please call and ask for Tacos Davis  at [phone].

## 2021-01-13 ENCOUNTER — OFFICE VISIT (OUTPATIENT)
Dept: CARDIOLOGY CLINIC | Age: 71
End: 2021-01-13
Payer: MEDICARE

## 2021-01-13 VITALS
WEIGHT: 237 LBS | DIASTOLIC BLOOD PRESSURE: 86 MMHG | HEART RATE: 73 BPM | HEIGHT: 66 IN | BODY MASS INDEX: 38.09 KG/M2 | SYSTOLIC BLOOD PRESSURE: 137 MMHG

## 2021-01-13 DIAGNOSIS — E78.00 PURE HYPERCHOLESTEROLEMIA: ICD-10-CM

## 2021-01-13 DIAGNOSIS — I10 ESSENTIAL HYPERTENSION: ICD-10-CM

## 2021-01-13 DIAGNOSIS — Z95.0 PACEMAKER: ICD-10-CM

## 2021-01-13 DIAGNOSIS — I47.1 PSVT (PAROXYSMAL SUPRAVENTRICULAR TACHYCARDIA) (HCC): ICD-10-CM

## 2021-01-13 PROCEDURE — 99214 OFFICE O/P EST MOD 30 MIN: CPT | Performed by: INTERNAL MEDICINE

## 2021-01-13 RX ORDER — AMLODIPINE BESYLATE 10 MG/1
10 TABLET ORAL DAILY
Qty: 90 TABLET | Refills: 3 | Status: SHIPPED | OUTPATIENT
Start: 2021-01-13 | End: 2022-01-20 | Stop reason: SDUPTHER

## 2021-01-13 RX ORDER — ATENOLOL AND CHLORTHALIDONE TABLET 50; 25 MG/1; MG/1
0.5 TABLET ORAL DAILY
Qty: 45 TABLET | Refills: 3 | Status: SHIPPED | OUTPATIENT
Start: 2021-01-13 | End: 2021-12-27

## 2021-01-13 NOTE — PROGRESS NOTES
Madelene Mohs (:  1950) is a 69 y.o. female,     Patient is here for further evaluation and follow up for hypertension and hyperlipidemia and the history of paroxysmal supraventricular tachyarrhythmia on flecainide therapy status post permanent pacemaker implantation. She denies any cardiac symptoms. She is active at home does not exercise and not able to go to [facility] due to poor 19. Her mother who was 8-year-old in nursing home passed away recently due to Coumadin 19 but she did not have much exposure since she was infected. Medications are reviewed and she has been compliant to her medications. She does not smoke. Allergies   Allergen Reactions    Sulfa Antibiotics Hives     Prior to Admission medications    Medication Sig Start Date End Date Taking? Authorizing Provider   atenolol-chlorthalidone (TENORETIC) 50-25 MG per tablet Take 0.5 tablets by mouth daily 21  Yes Zelda Peabody, MD   amLODIPine (NORVASC) 10 MG tablet Take 1 tablet by mouth daily 21  Yes Zelda Peabody, MD   flecainide [facility] [place]) 150 MG tablet Take 1 tablet by mouth 2 times daily 20  Yes Zelda Peabody, MD   pravastatin (PRAVACHOL) 80 MG tablet One daily 20  Yes Zelda Peabody, MD   omeprazole (PRILOSEC) 40 MG delayed release capsule Take 40 mg by mouth daily   Yes Historical Provider, MD   naproxen (NAPROSYN) 500 MG tablet Take 500 mg by mouth as needed for Pain   Yes Historical Provider, MD   allopurinol (ZYLOPRIM) 300 MG tablet Take 300 mg by mouth daily   Yes Historical Provider, MD   Multiple Vitamin (MULTIVITAMIN PO) Take  by mouth daily.      Yes Historical Provider, MD     Past Medical History:   Diagnosis Date    Abnormal Holter monitor finding     2003-Symptomatic Sick Sinus Syndrome    Cardiac arrhythmia     symptomatic SV arrhythmias    DVT (deep venous thrombosis) (HCC)     post right knee replacement/IVC filter 2012    Family history of cardiac dysrhythmia     Father    H/O cardiovascular stress test     10/2007-Post stress myocardial perfusion images show a normal pattern of perfusion in all regions. Post stress LV normal in size. This is normal Perfusion Cardiolite study. EF70%. Global LV systolic function normal. Exercise capacity 9 METS. Exercise capacity is normal.     H/O cardiovascular stress test 03/19/2019    EF 64%, Breast attenuation otherwise normal perfusion in distribution of all coronaries.  H/O chest x-ray 9/27/2011    Nonacute    H/O echocardiogram     12/2003, 8/1993    Hiatal hernia with gastroesophageal reflux     History of complete ECG     5/17/2012, 9/26/2011,3/11/2011,9/23/2009,9/5/2007,1/21/2004    History of sick sinus syndrome     HX OTHER MEDICAL     MEDTRONIC CARELINK    Hyperlipidemia     Hypertension     Obesity     Pacemaker     9/29/2011-Battery replacement- 1/26/2004- Repositioning of lead; 12/12/2003-Medtronic-Dual chamber Medtronic Model , serial [id number] ;    Venous insufficiency 8/24/2017      Vitals:    01/13/21 1150   BP: 137/86   Pulse: 73   Weight: 237 lb (107.5 kg)   Height: 5' 6\" (1.676 m)      Body mass index is 38.25 kg/m². Wt Readings from Last 3 Encounters:   01/13/21 237 lb (107.5 kg)   10/16/19 240 lb 12.8 oz (109.2 kg)   09/18/19 240 lb (108.9 kg)     Constitutional:  Patient is moderately overweight female in no apparent distress. HEENT: She is wearing glasses and facemask. Cardiovascular: Auscultation: Normal S1 and S2. No murmurs noted. Carotids are negative for bruits. Abdominal aorta is palpable. No epigastric bruit noted. Peripheral pulses: 2+ plus. Pacemaker pocket is intact. Respiratory:  Respiratory effort is normal. Breath sounds are clear to auscultation. Extremities:  No pitting edema, clubbing,  Cyanosis, petechiae. Abdomen:  No masses or tenderness. No organomegaly noted. Neurologic:  Oriented to time, place, and person and non-anxious.  No focal neurological deficit noted.  Psychiatric: Normal mood and effect. Pertinent records reviewed and discussed with patient and results are as follow:    Pacer analysis done on the December 27, 2020 is reviewed is consistent with normal dual-chamber non-MRI Medtronic pacer function with stable leads and appropriate battery status for the age of the device. Remaining average battery life is 3.5 years. Device is programmed to DDD mode lower rate of 60 and 68% sensing in both atrium and ventricle and 30% pacing in the atrium and sensing in the ventricle. Lab Results   Component Value Date    WBC 7.6 01/14/2020    HGB 13.5 01/14/2020    HCT 40.7 01/14/2020     01/14/2020     Lab Results   Component Value Date    CHOL 166 01/15/2020    TRIG 172 01/15/2020    HDL 44 01/15/2020    LDLCALC 88 01/15/2020     Lab Results   Component Value Date    BUN 16 01/14/2020    CREATININE 1.06 01/14/2020     01/14/2020    K 3.8 01/14/2020     Lab Results   Component Value Date    INR 2.06 11/06/2012     ASSESSMENT/PLAN:    1. Pacemaker  Assessment & Plan:  Working well with the remaining longevity of 3.5 years. We'll continue to monitor as per care link schedule. Patient is not pacer dependent. 2. Pure hypercholesterolemia  Assessment & Plan:  Fairly well controlled with the last LDL of 88 last year. She is due to have it checked next week again. Continue current statin therapy. 3. Essential hypertension  Assessment & Plan:  Well-controlled on current medications continue the same. 4. PSVT (paroxysmal supraventricular tachycardia) ([facility])  Assessment & Plan:  Well-controlled on flecainide and she is tolerating it well. Continue the same. Will do EKG on follow-up visit for QTc. Continue device check as per care link schedule. Continue current cardiovascular medications which have been reviewed and discussed individually with you. Continue to lose weight. Follow-up in 6 months with EKG, sooner if needed.   We have discussed

## 2021-01-13 NOTE — ASSESSMENT & PLAN NOTE
Well-controlled on flecainide and she is tolerating it well. Continue the same. Will do EKG on follow-up visit for QTc.

## 2021-01-13 NOTE — ASSESSMENT & PLAN NOTE
Working well with the remaining longevity of 3.5 years. We'll continue to monitor as per care link schedule. Patient is not pacer dependent.

## 2021-03-19 RX ORDER — FLECAINIDE ACETATE 150 MG/1
150 TABLET ORAL 2 TIMES DAILY
Qty: 180 TABLET | Refills: 3 | Status: SHIPPED | OUTPATIENT
Start: 2021-03-19 | End: 2022-01-20 | Stop reason: SDUPTHER

## 2021-04-13 ENCOUNTER — PROCEDURE VISIT (OUTPATIENT)
Dept: CARDIOLOGY CLINIC | Age: 71
End: 2021-04-13
Payer: MEDICARE

## 2021-04-13 DIAGNOSIS — Z95.0 CARDIAC PACEMAKER IN SITU: Primary | ICD-10-CM

## 2021-04-13 PROCEDURE — 93296 REM INTERROG EVL PM/IDS: CPT | Performed by: INTERNAL MEDICINE

## 2021-04-13 PROCEDURE — 93294 REM INTERROG EVL PM/LDLS PM: CPT | Performed by: INTERNAL MEDICINE

## 2021-05-19 RX ORDER — PRAVASTATIN SODIUM 80 MG/1
TABLET ORAL
Qty: 90 TABLET | Refills: 3 | Status: SHIPPED | OUTPATIENT
Start: 2021-05-19 | End: 2022-01-20 | Stop reason: SDUPTHER

## 2021-06-11 ENCOUNTER — TELEPHONE (OUTPATIENT)
Dept: CARDIOLOGY CLINIC | Age: 71
End: 2021-06-11

## 2021-06-11 NOTE — TELEPHONE ENCOUNTER
Finn called with a allergy notice with   atenolol-chlorthalidone   Patient has a   Noted sulfa allergy , she needs to know if its  Ok to fill  Ref # [id number]

## 2021-07-14 ENCOUNTER — OFFICE VISIT (OUTPATIENT)
Dept: CARDIOLOGY CLINIC | Age: 71
End: 2021-07-14
Payer: MEDICARE

## 2021-07-14 VITALS
HEIGHT: 66 IN | HEART RATE: 64 BPM | BODY MASS INDEX: 34.07 KG/M2 | SYSTOLIC BLOOD PRESSURE: 136 MMHG | WEIGHT: 212 LBS | DIASTOLIC BLOOD PRESSURE: 86 MMHG

## 2021-07-14 DIAGNOSIS — E78.2 MIXED HYPERLIPIDEMIA: ICD-10-CM

## 2021-07-14 DIAGNOSIS — Z95.0 PACEMAKER: Primary | ICD-10-CM

## 2021-07-14 DIAGNOSIS — I47.1 PSVT (PAROXYSMAL SUPRAVENTRICULAR TACHYCARDIA) (HCC): ICD-10-CM

## 2021-07-14 DIAGNOSIS — I10 ESSENTIAL HYPERTENSION: ICD-10-CM

## 2021-07-14 PROCEDURE — 99214 OFFICE O/P EST MOD 30 MIN: CPT | Performed by: INTERNAL MEDICINE

## 2021-07-14 RX ORDER — FLUTICASONE PROPIONATE 50 MCG
1 SPRAY, SUSPENSION (ML) NASAL DAILY
COMMUNITY

## 2021-07-14 RX ORDER — AMOXICILLIN 500 MG
CAPSULE ORAL
COMMUNITY

## 2021-07-14 RX ORDER — MULTIVIT WITH MINERALS/LUTEIN
250 TABLET ORAL DAILY
COMMUNITY

## 2021-07-14 RX ORDER — PHENOL 1.4 %
1 AEROSOL, SPRAY (ML) MUCOUS MEMBRANE DAILY
COMMUNITY

## 2021-07-14 NOTE — PROGRESS NOTES
80 mg daily. 3. Essential hypertension  Assessment & Plan:  Blood pressure is well controlled on Tenoretic and amlodipine. Continue both. 4. PSVT (paroxysmal supraventricular tachycardia) ([facility])  Assessment & Plan:  Had not had much symptomatic episodes lately. Monitor clinically as well as through pacer interrogations. Continue current cardiovascular medications which have been reviewed and discussed individually with you. Continue to lose weight. Continue device check as per care link schedule. Follow-up in 6 months, sooner if needed. An electronic signature was used to authenticate this note.     --Amy Varela MD

## 2021-07-14 NOTE — PATIENT INSTRUCTIONS
Continue current cardiovascular medications which have been reviewed and discussed individually with you. Continue to lose weight. Continue device check as per care link schedule. Follow-up in 6 months, sooner if needed.

## 2021-07-14 NOTE — ASSESSMENT & PLAN NOTE
Last check in April this year reported remaining device battery life of 3 years. She is not dependent on pacemaker. Continue pacer check as per care link schedule.

## 2021-07-14 NOTE — ASSESSMENT & PLAN NOTE
Lipids are reviewed in care everywhere from January this year and compared with the one from last year and discussed with patient. Overall they have improved. .  Continue pravastatin 80 mg daily.

## 2021-07-14 NOTE — ASSESSMENT & PLAN NOTE
Had not had much symptomatic episodes lately. Monitor clinically as well as through pacer interrogations.

## 2021-07-19 ENCOUNTER — PROCEDURE VISIT (OUTPATIENT)
Dept: CARDIOLOGY CLINIC | Age: 71
End: 2021-07-19
Payer: MEDICARE

## 2021-07-19 DIAGNOSIS — Z95.0 CARDIAC PACEMAKER IN SITU: Primary | ICD-10-CM

## 2021-07-19 DIAGNOSIS — I49.5 SINUS NODE DYSFUNCTION (HCC): ICD-10-CM

## 2021-07-19 PROCEDURE — 93296 REM INTERROG EVL PM/IDS: CPT | Performed by: INTERNAL MEDICINE

## 2021-07-19 PROCEDURE — 93294 REM INTERROG EVL PM/LDLS PM: CPT | Performed by: INTERNAL MEDICINE

## 2021-09-28 ENCOUNTER — TELEPHONE (OUTPATIENT)
Dept: CARDIOLOGY CLINIC | Age: 71
End: 2021-09-28

## 2021-09-28 NOTE — TELEPHONE ENCOUNTER
Patient called stating that her next scheduled ppm check is on 10/24 but she will be on vacation, she would like to do it the following week. If that is a problem, please give her a call.

## 2021-10-27 ENCOUNTER — TELEPHONE (OUTPATIENT)
Dept: CARDIOLOGY CLINIC | Age: 71
End: 2021-10-27

## 2021-11-02 ENCOUNTER — TELEPHONE (OUTPATIENT)
Dept: CARDIOLOGY CLINIC | Age: 71
End: 2021-11-02

## 2021-11-02 ENCOUNTER — PROCEDURE VISIT (OUTPATIENT)
Dept: CARDIOLOGY CLINIC | Age: 71
End: 2021-11-02
Payer: MEDICARE

## 2021-11-02 DIAGNOSIS — I49.5 SINUS NODE DYSFUNCTION (HCC): ICD-10-CM

## 2021-11-02 DIAGNOSIS — Z95.0 CARDIAC PACEMAKER IN SITU: Primary | ICD-10-CM

## 2021-11-02 PROCEDURE — 93296 REM INTERROG EVL PM/IDS: CPT | Performed by: INTERNAL MEDICINE

## 2021-11-02 PROCEDURE — 93294 REM INTERROG EVL PM/LDLS PM: CPT | Performed by: INTERNAL MEDICINE

## 2021-11-02 NOTE — LETTER
Jaquan [address]  [address] [place] [address]  Phone: [phone]  Fax: [phone]            November 2, 2021    [facility] [address]      Dear Mahamed Martinez: This is your CARELINK schedule. Please edilma your calendar with these dates. You can do your checks anytime during the scheduled day. Since we do not do reminder calls, it will be your responsibility to perform the checks on the day it is scheduled. If you have any questions or concerns, please call and ask for Dedra Davies at [phone].

## 2021-12-27 RX ORDER — ATENOLOL AND CHLORTHALIDONE TABLET 50; 25 MG/1; MG/1
TABLET ORAL
Qty: 45 TABLET | Refills: 3 | Status: SHIPPED | OUTPATIENT
Start: 2021-12-27

## 2022-01-20 ENCOUNTER — OFFICE VISIT (OUTPATIENT)
Dept: CARDIOLOGY CLINIC | Age: 72
End: 2022-01-20
Payer: MEDICARE

## 2022-01-20 VITALS
HEIGHT: 66 IN | WEIGHT: 223.8 LBS | DIASTOLIC BLOOD PRESSURE: 82 MMHG | HEART RATE: 68 BPM | SYSTOLIC BLOOD PRESSURE: 134 MMHG | BODY MASS INDEX: 35.97 KG/M2

## 2022-01-20 DIAGNOSIS — Z95.0 PACEMAKER: Primary | ICD-10-CM

## 2022-01-20 DIAGNOSIS — I10 PRIMARY HYPERTENSION: ICD-10-CM

## 2022-01-20 DIAGNOSIS — E78.00 PURE HYPERCHOLESTEROLEMIA: ICD-10-CM

## 2022-01-20 DIAGNOSIS — I47.1 PSVT (PAROXYSMAL SUPRAVENTRICULAR TACHYCARDIA) (HCC): ICD-10-CM

## 2022-01-20 PROCEDURE — 99214 OFFICE O/P EST MOD 30 MIN: CPT | Performed by: INTERNAL MEDICINE

## 2022-01-20 RX ORDER — FLECAINIDE ACETATE 150 MG/1
150 TABLET ORAL 2 TIMES DAILY
Qty: 180 TABLET | Refills: 3 | Status: SHIPPED | OUTPATIENT
Start: 2022-01-20

## 2022-01-20 RX ORDER — AMLODIPINE BESYLATE 10 MG/1
10 TABLET ORAL DAILY
Qty: 90 TABLET | Refills: 3 | Status: SHIPPED | OUTPATIENT
Start: 2022-01-20

## 2022-01-20 RX ORDER — PRAVASTATIN SODIUM 80 MG/1
TABLET ORAL
Qty: 90 TABLET | Refills: 3 | Status: SHIPPED | OUTPATIENT
Start: 2022-01-20

## 2022-01-20 NOTE — PATIENT INSTRUCTIONS
Continue device check as per care link schedule. Continue current cardiovascular medications which have been reviewed and discussed individually with you. Follow-up in 6 months, sooner if needed.

## 2022-01-20 NOTE — PROGRESS NOTES
Elena Salguero (:  1950) is a 66 y.o. female,     Chief Complaint   Patient presents with    6 Month Follow-Up     no new cardiac sx, no future procedures, does not exercise much because of covid. Patient is here for follow up for hypertension and hyperlipidemia and sick sinus syndrome status post permanent pacemaker implantation. She denies any cardiac symptoms. She has gained weight as she is not as active as she was before because of her right hip pain for which she is seeing Dr. Dirk Nicholson. She is status post bilateral knee replacement. She is compliant to her medications and she is fully vaccinated against COVID-19. Allergies   Allergen Reactions    Sulfa Antibiotics Hives     Prior to Admission medications    Medication Sig Start Date End Date Taking?  Authorizing Provider   pravastatin (PRAVACHOL) 80 MG tablet One daily 22  Yes Corbin Chaparro MD   flecainide [facility] [place]) 150 MG tablet Take 1 tablet by mouth 2 times daily 22  Yes Corbin Chaparro MD   amLODIPine (NORVASC) 10 MG tablet Take 1 tablet by mouth daily 22  Yes Corbin Chaparro MD   atenolol-chlorthalidone (TENORETIC) 50-25 MG per tablet TAKE 1/2 TABLET DAILY 21  Yes Corbin Chaparro MD   BIOTIN PO Take by mouth   Yes Historical Provider, MD   fluticasone (FLONASE) 50 MCG/ACT nasal spray 1 spray by Each Nostril route daily   Yes Historical Provider, MD   Omega-3 Fatty Acids (FISH OIL) 1200 MG CAPS Take by mouth   Yes Historical Provider, MD   vitamin D (CHOLECALCIFEROL) 25 MCG (1000 UT) TABS tablet Take 1,000 Units by mouth daily   Yes Historical Provider, MD   calcium carbonate 600 MG TABS tablet Take 1 tablet by mouth daily   Yes Historical Provider, MD   Ascorbic Acid (VITAMIN C) 250 MG tablet Take 250 mg by mouth daily   Yes Historical Provider, MD   omeprazole (PRILOSEC) 40 MG delayed release capsule Take 40 mg by mouth daily   Yes Historical Provider, MD   naproxen (NAPROSYN) 500 MG tablet Take 500 mg by mouth as needed for Pain   Yes Historical Provider, MD   allopurinol (ZYLOPRIM) 300 MG tablet Take 300 mg by mouth daily   Yes Historical Provider, MD   Multiple Vitamin (MULTIVITAMIN PO) Take  by mouth daily. Yes Historical Provider, MD     Past Medical History:   Diagnosis Date    Abnormal Holter monitor finding     11/2003-Symptomatic Sick Sinus Syndrome    Cardiac arrhythmia     symptomatic SV arrhythmias    DVT (deep venous thrombosis) (HCC)     post right knee replacement/IVC filter 5/2012    Family history of cardiac dysrhythmia     Father    H/O cardiovascular stress test     10/2007-Post stress myocardial perfusion images show a normal pattern of perfusion in all regions. Post stress LV normal in size. This is normal Perfusion Cardiolite study. EF70%. Global LV systolic function normal. Exercise capacity 9 METS. Exercise capacity is normal.     H/O cardiovascular stress test 03/19/2019    EF 64%, Breast attenuation otherwise normal perfusion in distribution of all coronaries.  H/O chest x-ray 9/27/2011    Nonacute    H/O echocardiogram     12/2003, 8/1993    Hiatal hernia with gastroesophageal reflux     History of complete ECG     5/17/2012, 9/26/2011,3/11/2011,9/23/2009,9/5/2007,1/21/2004    History of sick sinus syndrome     HX OTHER MEDICAL     MEDTRONIC CARELINK    Hyperlipidemia     Hypertension     Obesity     Pacemaker     9/29/2011-Battery replacement- 1/26/2004- Repositioning of lead; 12/12/2003-Medtronic-Dual chamber Medtronic Model , serial [id number] ;    Venous insufficiency 8/24/2017      Vitals:    01/20/22 1100   BP: 134/82   Pulse: 68   Weight: 223 lb 12.8 oz (101.5 kg)   Height: 5' 6\" (1.676 m)      Body mass index is 36.12 kg/m². Wt Readings from Last 3 Encounters:   01/20/22 223 lb 12.8 oz (101.5 kg)   07/14/21 212 lb (96.2 kg)   01/13/21 237 lb (107.5 kg)     Constitutional:  Patient is moderately obese female in no apparent distress.   HEENT: Wearing glasses and facemask. Cardiovascular: Auscultation: Normal S1 and S2. No significant murmurs or gallops noted. Respiratory:  Respiratory effort is normal. Breath sounds are clear to auscultation. Extremities:  No edema, clubbing,  Cyanosis, petechiae. Abdomen:  No masses or tenderness. No organomegaly noted. Neurologic:  Oriented to time, place, and person and non-anxious. No focal neurological deficit noted. She walks with a significant limp due to right hip arthritis. Psychiatric: Normal mood and effect. Pacer analysis from 10/31/2021 is reviewed is consistent with normal dual-chamber non-MRI Medtronic pacer function with stable leads and appropriate battery status for the age of the device. Remaining average battery life is 3 years. Device is programmed to DDD mode lower rate of 60 bpm and 61% sensing in both atrium and ventricle and 29% pacing in the atrium sensing in the ventricle. Pertinent records reviewed and discussed with patient and results are as follow:    Lab Results   Component Value Date    WBC 7.6 01/14/2020    HGB 13.5 01/14/2020    HCT 40.7 01/14/2020     01/14/2020     Lab Results   Component Value Date    CHOL 166 01/15/2020    TRIG 172 01/15/2020    HDL 44 01/15/2020    LDLCALC 88 01/15/2020   Lipid panel from January 18, 2021 in care everywhere reported total cholesterol 153 triglycerides 161 HDL 45 and LDL 76.  Lab Results   Component Value Date    BUN 16 01/14/2020    CREATININE 1.06 01/14/2020     01/14/2020    K 3.8 01/14/2020     Lab Results   Component Value Date    INR 2.06 11/06/2012     ASSESSMENT/PLAN:    1. Pacemaker  Assessment & Plan:  Working well with remaining battery longevity of 3 years. We will continue to monitor as per remote CareLink monitoring and patient is not dependent on it. 2. Primary hypertension  Assessment & Plan:  Well-controlled on Tenoretic half a pill daily and amlodipine. Continue the same.    3. Pure hypercholesterolemia  Assessment & Plan:  Last LDL reported was 76 last year. Continue pravastatin 80 mg daily   4. PSVT (paroxysmal supraventricular tachycardia) (HCC)  Assessment & Plan:  Controlled on flecainide 150 mg daily. Continue the same. Continue device check as per care link schedule. Continue current cardiovascular medications which have been reviewed and discussed individually with you. Follow-up in 6 months with EKG, sooner if needed. An electronic signature was used to authenticate this note.     --April Rosenthal MD

## 2022-01-20 NOTE — ASSESSMENT & PLAN NOTE
Working well with remaining battery longevity of 3 years. We will continue to monitor as per remote CareLink monitoring and patient is not dependent on it.

## 2022-05-09 ENCOUNTER — PROCEDURE VISIT (OUTPATIENT)
Dept: CARDIOLOGY CLINIC | Age: 72
End: 2022-05-09
Payer: MEDICARE

## 2022-05-09 DIAGNOSIS — I49.5 SINUS NODE DYSFUNCTION (HCC): ICD-10-CM

## 2022-05-09 DIAGNOSIS — Z95.0 CARDIAC PACEMAKER IN SITU: Primary | ICD-10-CM

## 2022-05-09 PROCEDURE — 93294 REM INTERROG EVL PM/LDLS PM: CPT | Performed by: INTERNAL MEDICINE

## 2022-05-09 PROCEDURE — 93296 REM INTERROG EVL PM/IDS: CPT | Performed by: INTERNAL MEDICINE

## 2022-07-20 ENCOUNTER — OFFICE VISIT (OUTPATIENT)
Dept: CARDIOLOGY CLINIC | Age: 72
End: 2022-07-20
Payer: MEDICARE

## 2022-07-20 VITALS
SYSTOLIC BLOOD PRESSURE: 134 MMHG | WEIGHT: 230.8 LBS | DIASTOLIC BLOOD PRESSURE: 82 MMHG | HEIGHT: 66 IN | HEART RATE: 62 BPM | BODY MASS INDEX: 37.09 KG/M2

## 2022-07-20 DIAGNOSIS — E78.00 PURE HYPERCHOLESTEROLEMIA: ICD-10-CM

## 2022-07-20 DIAGNOSIS — Z95.0 CARDIAC PACEMAKER IN SITU: Primary | ICD-10-CM

## 2022-07-20 DIAGNOSIS — Z95.0 PACEMAKER: ICD-10-CM

## 2022-07-20 DIAGNOSIS — I10 PRIMARY HYPERTENSION: ICD-10-CM

## 2022-07-20 DIAGNOSIS — I47.1 PSVT (PAROXYSMAL SUPRAVENTRICULAR TACHYCARDIA) (HCC): ICD-10-CM

## 2022-07-20 PROCEDURE — 93000 ELECTROCARDIOGRAM COMPLETE: CPT | Performed by: INTERNAL MEDICINE

## 2022-07-20 PROCEDURE — 99214 OFFICE O/P EST MOD 30 MIN: CPT | Performed by: INTERNAL MEDICINE

## 2022-07-20 PROCEDURE — 1123F ACP DISCUSS/DSCN MKR DOCD: CPT | Performed by: INTERNAL MEDICINE

## 2022-07-20 NOTE — PATIENT INSTRUCTIONS
Continue current cardiovascular medications which have been reviewed and discussed individually with you. Counseled for calorie counting, reduction in serving size and exercise and lifestyle modification for weight loss. Appropriate prescriptions if needed on this visit are addressed. After visit summery is provided. Questions answered and patient verbalizes understanding. Follow up in 6 months,  sooner if needed.

## 2022-07-20 NOTE — PROGRESS NOTES
Gisell Coughlin  1950  Jacey Ahn MD      Chief Complaint   Patient presents with    Follow-up     Pt denies any cardiac sx. Does not smoke or drink, does not get much exercise. No future procedures. Chief complaint and HPI:  Gisell Coughlin  is a 66 y.o. female following up history of PSVT controlled on flecainide and has hypertension and hyperlipidemia and history of sick sinus syndrome status post permanent pacemaker implantation 2011. She denies any new cardiac symptoms. She is having lots of hip pain for which she is getting injections done which gives her relief for few months. She is not exercising at [facility] and has gained weight. Medications are reviewed and reconciled. She has been compliant to her medication. She does not smoke. Rest of the Cardiovascular system review is otherwise unchanged from prior encounter. Past medical history:  has a past medical history of Abnormal Holter monitor finding, Cardiac arrhythmia, DVT (deep venous thrombosis) ([address] Family history of cardiac dysrhythmia, H/O cardiovascular stress test, H/O cardiovascular stress test, H/O chest x-ray, H/O echocardiogram, Hiatal hernia with gastroesophageal reflux, History of complete ECG, History of sick sinus syndrome, HX OTHER MEDICAL, Hyperlipidemia, Hypertension, Obesity, Pacemaker, and Venous insufficiency. Past surgical history:  has a past surgical history that includes Hysterectomy; Cardiac pacemaker placement (12/12/2003); Cardiac pacemaker placement (9/29/2011); and joint replacement (5/12 & 9/12). Social History:   Social History     Tobacco Use    Smoking status: Never    Smokeless tobacco: Never   Substance Use Topics    Alcohol use: No     Alcohol/week: 0.0 standard drinks     Comment: caffeine 2 coffees a day 1 pops a day      Family history: family history includes Arrhythmia in her father. ALLERGIES:  Sulfa antibiotics  Prior to Admission medications    Medication Sig Start Date End Date Taking? Authorizing Provider   pravastatin (PRAVACHOL) 80 MG tablet One daily 1/20/22  Yes Shayan Salvador MD   flecainide [facility]) 150 MG tablet Take 1 tablet by mouth 2 times daily 1/20/22  Yes Shayan Salvador MD   amLODIPine (NORVASC) 10 MG tablet Take 1 tablet by mouth daily 1/20/22  Yes Shayan Salvador MD   atenolol-chlorthalidone (TENORETIC) 50-25 MG per tablet TAKE 1/2 TABLET DAILY 12/27/21  Yes Shayan Salvador MD   BIOTIN PO Take by mouth   Yes Historical Provider, MD   fluticasone (FLONASE) 50 MCG/ACT nasal spray 1 spray by Each Nostril route daily   Yes Historical Provider, MD   Omega-3 Fatty Acids (FISH OIL) 1200 MG CAPS Take by mouth   Yes Historical Provider, MD   vitamin D (CHOLECALCIFEROL) 25 MCG (1000 UT) TABS tablet Take 1,000 Units by mouth daily   Yes Historical Provider, MD   calcium carbonate 600 MG TABS tablet Take 1 tablet by mouth daily   Yes Historical Provider, MD   Ascorbic Acid (VITAMIN C) 250 MG tablet Take 250 mg by mouth daily   Yes Historical Provider, MD   omeprazole (PRILOSEC) 40 MG delayed release capsule Take 40 mg by mouth daily   Yes Historical Provider, MD   naproxen (NAPROSYN) 500 MG tablet Take 500 mg by mouth as needed for Pain   Yes Historical Provider, MD   allopurinol (ZYLOPRIM) 300 MG tablet Take 300 mg by mouth daily   Yes Historical Provider, MD   Multiple Vitamin (MULTIVITAMIN PO) Take  by mouth daily. Yes Historical Provider, MD     Vitals:    07/20/22 1122   BP: 134/82   Pulse: 62   Weight: 230 lb 12.8 oz (104.7 kg)   Height: 5' 6\" (1.676 m)      Body mass index is 37.25 kg/m². Wt Readings from Last 3 Encounters:   07/20/22 230 lb 12.8 oz (104.7 kg)   01/20/22 223 lb 12.8 oz (101.5 kg)   07/14/21 212 lb (96.2 kg)     Constitutional:  Patient is moderately obese female in no apparent distress. Gained 7 pounds since last visit 1/20/22  HEENT: Wearing glasses and facemask. Cardiovascular: Auscultation: Normal S1 and S2.   No significant murmurs or gallops noted.  Respiratory:  Respiratory effort is normal. Breath sounds are clear to auscultation. Extremities:  Trace bilateral pitting edema noted. Abdomen:  No masses or tenderness. No organomegaly noted. Neurologic:  Oriented to time, place, and person and non-anxious. No focal neurological deficit noted. She walks with a significant limp due to right hip arthritis. Psychiatric: Normal mood and effect. EKG today is consistent with atrial paced rhythm 60 bpm with prolonged AV conduction and QTC is 446 ms. Pacer analysis from 5/8/2022 is reviewed is consistent with normal dual-chamber non-MRI Medtronic pacer function with stable leads and appropriate battery status for the age of the device. Remaining average battery life is 28 months. Device is programmed to DDD mode lower rate of 60 bpm and 69% sensing in both atrium andventricle and 30% pacing in the atrium sensing in the ventricle.      LAB REVIEW: Following most recent labs are reviewed in Care Everywhere and discussed with patient  Component 02/11/22 01/18/21 01/14/20 02/12/19 02/08/18 01/30/17   Cholesterol, Total 163  153  166 141  173  195    Triglycerides 78  161 High   172 High  149  146  164 High     HDL-C 58  45 Low   -- 44  47  42    VLDL 16 32 -- 30 29 33   LDL Cholesterol 89  76  -- 67  97  120 High       CBC:   Component 02/11/22 01/18/21 01/18/21 01/14/20 02/12/19 02/08/18   Sodium 143 141 -- 142 142 143   Potassium 4.0 3.4 -- 3.8 3.4 4.2   Chloride 103 100 -- 101 101 99   CARBON DIOXIDE 28 31 -- 26 32 30   Glucose 106  132 High   -- 121 High  111 High   108 High     BUN 25 16 -- 17 14 12   Creatinine 1.1 1.1 -- -- 1.0  1.1    BUN/Creatinine Ratio 23 15 -- 16 14 11   GFRN 50 -- -- -- -- --   Calcium 9.9 9.6 -- 9.7 9.8 9.7   Total Protein 6.8 7.0 -- -- 6.8 7.1   ALBUMIN 4.6 4.4 -- -- 4.6 4.3   Globulin 2.2 2.6 -- -- 2.2 2.8   Albumin/Globulin Ratio 2.1 1.7 -- 1.5 2.1 1.5   Bilirubin, Total 0.4 0.3 -- 0.4 0.4 0.3   AST 17 23 -- 21 20 21   ALT 23 23 -- 24 24 25   Alkaline Phosphatase 78 70 -- -- 85 75   Fasting FASTING FASTING FASTING -- --    Related to Hemoglobin A1C, B ([facility] Only)  Component 02/11/22 04/20/21 01/18/21   Hemoglobin A1C 6.5  6.5 Abnormal   7.1 High       Component 02/11/22 01/18/21 01/14/20 02/12/19 02/08/18 01/30/17   WBC 9.5 9.2 7.6 8.3 7.2 6.4   RBC 4.49 4.26 4.53 4.29 4.49 4.45   HGB 13.4 12.4 13.5 12.8 13.2 --   HCT 38.9 38.0 40.7 38.8 39.5 --   MCV 86.6 89.1 90 90.4 88.0 88.0   MCH 29.8 29.1 29.8 29.8 29.5 29.0   MCHC 34.4 32.6 33.2 33.0 33.5 33.0   RDW 13.7 15.5 High  15.5 High   15.8 High  14.1 13.9   Platelet count 370 566 343 303 300 281     IMPRESSION and RECOMMENDATIONS:      1. Cardiac pacemaker in situ  -     EKG 12 lead  2. Pacemaker 9/2011  Assessment & Plan:  Working well with the remaining battery average longevity of 28 months. We will continue to monitor remotely every 3 months. 3. Pure hypercholesterolemia  Assessment & Plan:  Most recent lipid results are discussed from 2/11/2022. LDL was 89. Continue pravastatin 80 mg a day. 4. Primary hypertension  Assessment & Plan:  Well-controlled on current medications of Tenoretic and amlodipine. 5. PSVT (paroxysmal supraventricular tachycardia) ([facility])  Assessment & Plan:  Controlled on the flecainide 150 mg twice a day and she is tolerating it well. Continue the same. Continue current cardiovascular medications which have been reviewed and discussed individually with you. Counseled for calorie counting, reduction in serving size and exercise and lifestyle modification for weight loss. Appropriate prescriptions if needed on this visit are addressed. After visit summery is provided. Questions answered and patient verbalizes understanding. Follow up in 6 months,  sooner if needed.     Gail Landin MD, 7/20/2022 11:48 AM     Please note this report has been partially produced using speech recognition software and may contain errors related to that system including errors in grammar, punctuation, and spelling, as well as words and phrases that may be inappropriate. If there are any questions or concerns please feel free to contact the dictating provider for clarification.

## 2022-07-20 NOTE — ASSESSMENT & PLAN NOTE
Most recent lipid results are discussed from 2/11/2022. LDL was 89. Continue pravastatin 80 mg a day.

## 2022-07-20 NOTE — ASSESSMENT & PLAN NOTE
Working well with the remaining battery average longevity of 28 months. We will continue to monitor remotely every 3 months.

## 2022-08-18 ENCOUNTER — TELEPHONE (OUTPATIENT)
Dept: CARDIOLOGY CLINIC | Age: 72
End: 2022-08-18

## 2022-08-18 NOTE — TELEPHONE ENCOUNTER
Returned call to patient and she advised me about monitor not working may be due to [employer] working on towers. She will try to send again over the weekend and if it does not work will order new monitor from [facility].

## 2022-08-20 PROCEDURE — 93296 REM INTERROG EVL PM/IDS: CPT | Performed by: INTERNAL MEDICINE

## 2022-08-20 PROCEDURE — 93294 REM INTERROG EVL PM/LDLS PM: CPT | Performed by: INTERNAL MEDICINE

## 2022-08-22 ENCOUNTER — PROCEDURE VISIT (OUTPATIENT)
Dept: CARDIOLOGY CLINIC | Age: 72
End: 2022-08-22
Payer: MEDICARE

## 2022-08-22 ENCOUNTER — TELEPHONE (OUTPATIENT)
Dept: CARDIOLOGY CLINIC | Age: 72
End: 2022-08-22

## 2022-08-22 DIAGNOSIS — Z95.0 CARDIAC PACEMAKER IN SITU: Primary | ICD-10-CM

## 2022-08-22 DIAGNOSIS — I49.5 SINUS NODE DYSFUNCTION (HCC): ICD-10-CM

## 2022-08-22 NOTE — LETTER
[address] [place] [address]. [facility] [address]  Phone: [phone]      August 22, 2022      [place] [id number]      Dear Waqar Grover: This is your CARELINK schedule. Please edilma your calendar with these dates. You can do you checks anytime during the scheduled day. Since we do not do reminder calls, it will be your responsibility to preform the checks on the day it is scheduled. If you have any questions or concerns, please call and ask for [place] at [phone]. Thank you.

## 2022-12-02 ENCOUNTER — PROCEDURE VISIT (OUTPATIENT)
Dept: CARDIOLOGY CLINIC | Age: 72
End: 2022-12-02

## 2022-12-02 DIAGNOSIS — I49.5 SINUS NODE DYSFUNCTION (HCC): ICD-10-CM

## 2022-12-02 DIAGNOSIS — Z95.0 CARDIAC PACEMAKER IN SITU: Primary | ICD-10-CM

## 2022-12-14 RX ORDER — ATENOLOL AND CHLORTHALIDONE TABLET 50; 25 MG/1; MG/1
0.5 TABLET ORAL DAILY
Qty: 45 TABLET | Refills: 3 | Status: SHIPPED | OUTPATIENT
Start: 2022-12-14

## 2023-01-03 RX ORDER — AMLODIPINE BESYLATE 10 MG/1
10 TABLET ORAL DAILY
Qty: 90 TABLET | Refills: 3 | Status: SHIPPED | OUTPATIENT
Start: 2023-01-03

## 2023-01-11 ENCOUNTER — OFFICE VISIT (OUTPATIENT)
Dept: CARDIOLOGY CLINIC | Age: 73
End: 2023-01-11
Payer: MEDICARE

## 2023-01-11 VITALS
HEIGHT: 66 IN | OXYGEN SATURATION: 96 % | WEIGHT: 226.6 LBS | HEART RATE: 61 BPM | SYSTOLIC BLOOD PRESSURE: 134 MMHG | BODY MASS INDEX: 36.42 KG/M2 | DIASTOLIC BLOOD PRESSURE: 78 MMHG

## 2023-01-11 DIAGNOSIS — I47.1 PSVT (PAROXYSMAL SUPRAVENTRICULAR TACHYCARDIA) (HCC): ICD-10-CM

## 2023-01-11 DIAGNOSIS — E78.00 PURE HYPERCHOLESTEROLEMIA: ICD-10-CM

## 2023-01-11 DIAGNOSIS — Z95.0 PACEMAKER: Primary | ICD-10-CM

## 2023-01-11 DIAGNOSIS — I10 PRIMARY HYPERTENSION: ICD-10-CM

## 2023-01-11 PROCEDURE — 3075F SYST BP GE 130 - 139MM HG: CPT | Performed by: INTERNAL MEDICINE

## 2023-01-11 PROCEDURE — 3078F DIAST BP <80 MM HG: CPT | Performed by: INTERNAL MEDICINE

## 2023-01-11 PROCEDURE — 1123F ACP DISCUSS/DSCN MKR DOCD: CPT | Performed by: INTERNAL MEDICINE

## 2023-01-11 PROCEDURE — 99214 OFFICE O/P EST MOD 30 MIN: CPT | Performed by: INTERNAL MEDICINE

## 2023-01-11 RX ORDER — FLECAINIDE ACETATE 150 MG/1
150 TABLET ORAL 2 TIMES DAILY
Qty: 180 TABLET | Refills: 3 | Status: SHIPPED | OUTPATIENT
Start: 2023-01-11

## 2023-01-11 RX ORDER — PRAVASTATIN SODIUM 80 MG/1
TABLET ORAL
Qty: 90 TABLET | Refills: 3 | Status: SHIPPED | OUTPATIENT
Start: 2023-01-11

## 2023-01-11 NOTE — PATIENT INSTRUCTIONS
Continue current cardiovascular medications which have been reviewed and discussed individually with you. Continue device check as per care link schedule. Appropriate prescriptions if needed on this visit are addressed. After visit summery is provided. Questions answered and patient verbalizes understanding. Follow up in 6 months with ECG,  sooner if needed. Please be informed that if you contact our office outside of normal business hours the physician on call cannot help with any scheduling or rescheduling issues, procedure instruction questions or any type of medication issue. We advise you for any urgent/emergency that you go to the nearest emergency room! PLEASE CALL OUR OFFICE DURING NORMAL BUSINESS HOURS    Monday - Friday   8 am to 5 pm    [place]: [address]: [phone]    [place]:  [phone]      **It is YOUR responsibilty to bring medication bottles and/or updated medication list to [address] [place] [address]. This will allow us to better serve you and all your healthcare needs**      Thank you for allowing us to care for you today! We want to ensure we can follow your treatment plan and we strive to give you the best outcomes and experience possible. If you ever have a life threatening emergency and call 911 - for an ambulance (EMS)   Our providers can only care for you at:   [facility] or [facility]. Even if you have someone take you or you drive yourself we can only care for you in a [facility] facility. Our providers are not setup at the other healthcare locations!

## 2023-01-11 NOTE — PROGRESS NOTES
Orville Villalba  1950  Kimberlyn Merlos MD      Chief Complaint   Patient presents with    6 Month Follow-Up     Pt has no cardiac complaints. Chief complaint and HPI:  Orville Villalba  is a 67 y.o. female following up history of hypertension hyperlipidemia and paroxysmal supraventricular tachyarrhythmias on flecainide therapy and permanent pacemaker. She denies any cardiac symptoms today. She is trying to lose some weight. Is compliant to her medications. She had labs done August last year and I have reviewed them in Care Everywhere. She has developed stiffness of her face and cough and has tested herself at home for COVID-19 and it was negative. Rest of the Cardiovascular system review is otherwise unchanged from prior encounter. Past medical history:  has a past medical history of Abnormal Holter monitor finding, Cardiac arrhythmia, DVT (deep venous thrombosis) ([facility] Family history of cardiac dysrhythmia, H/O cardiovascular stress test, H/O cardiovascular stress test, H/O chest x-ray, H/O echocardiogram, Hiatal hernia with gastroesophageal reflux, History of complete ECG, History of sick sinus syndrome, HX OTHER MEDICAL, Hyperlipidemia, Hypertension, Obesity, Pacemaker, and Venous insufficiency. Past surgical history:  has a past surgical history that includes Hysterectomy; Cardiac pacemaker placement (12/12/2003); Cardiac pacemaker placement (9/29/2011); and joint replacement (5/12 & 9/12). Social History:   Social History     Tobacco Use    Smoking status: Never    Smokeless tobacco: Never   Substance Use Topics    Alcohol use: No     Alcohol/week: 0.0 standard drinks     Comment: caffeine 2 coffees a day 1 pops a day      Family history: family history includes Arrhythmia in her father. ALLERGIES:  Sulfa antibiotics  Prior to Admission medications    Medication Sig Start Date End Date Taking?  Authorizing Provider   flecainide (TAMBOCOR) 150 MG tablet Take 1 tablet by mouth 2 times daily 1/11/23  Yes Noah Moreno MD   pravastatin (PRAVACHOL) 80 MG tablet One daily 1/11/23  Yes Noah Moreno MD   amLODIPine (NORVASC) 10 MG tablet Take 1 tablet by mouth daily 1/3/23  Yes Noah Moreno MD   atenolol-chlorthalidone (TENORETIC) 50-25 MG per tablet Take 0.5 tablets by mouth daily 12/14/22  Yes Noah Moreno MD   BIOTIN PO Take by mouth   Yes Historical Provider, MD   fluticasone (FLONASE) 50 MCG/ACT nasal spray 1 spray by Each Nostril route daily   Yes Historical Provider, MD   Omega-3 Fatty Acids (FISH OIL) 1200 MG CAPS Take by mouth   Yes Historical Provider, MD   vitamin D (CHOLECALCIFEROL) 25 MCG (1000 UT) TABS tablet Take 1,000 Units by mouth daily   Yes Historical Provider, MD   calcium carbonate 600 MG TABS tablet Take 1 tablet by mouth daily   Yes Historical Provider, MD   Ascorbic Acid (VITAMIN C) 250 MG tablet Take 250 mg by mouth daily   Yes Historical Provider, MD   omeprazole (PRILOSEC) 40 MG delayed release capsule Take 40 mg by mouth daily   Yes Historical Provider, MD   naproxen (NAPROSYN) 500 MG tablet Take 500 mg by mouth as needed for Pain   Yes Historical Provider, MD   allopurinol (ZYLOPRIM) 300 MG tablet Take 300 mg by mouth daily   Yes Historical Provider, MD   Multiple Vitamin (MULTIVITAMIN PO) Take  by mouth daily. Yes Historical Provider, MD     Vitals:    01/11/23 1122   BP: 134/78   Site: Right Upper Arm   Position: Sitting   Cuff Size: Large Adult   Pulse: 61   SpO2: 96%   Weight: 226 lb 9.6 oz (102.8 kg)   Height: 5' 6\" (1.676 m)      Body mass index is 36.57 kg/m². Wt Readings from Last 3 Encounters:   01/11/23 226 lb 9.6 oz (102.8 kg)   07/20/22 230 lb 12.8 oz (104.7 kg)   01/20/22 223 lb 12.8 oz (101.5 kg)     Constitutional:  Patient is moderately overweight female in no apparent distress. She lost 4 pounds since last visit. .  Eyes: She is wearing glasses pupils are equal she is also wearing a facemask because she has developed some cough.   NECK: No JVP or thyromegaly  Cardiovascular: Auscultation: Normal S1 and S2. No significant murmurs or gallops noted. Left-sided pacemaker pocket is intact. Respiratory:  Respiratory effort is normal. Breath sounds are clear to auscultation. Extremities:  No edema, clubbing,  Cyanosis, petechiae. SKIN: Warm and well perfused, no pallor or cyanosis  Neurologic:  Oriented to time, place, and person and non-anxious. No focal neurological deficit noted. Psychiatric: Normal mood and effect. Pacer analysis from November 27, 2022 is reviewed is consistent with normal dual-chamber non-MRI Medtronic pacer function with stable leads and appropriate battery status for the age of the device. Remaining average battery life is 20 months. Device is programmed to DDD mode lower rate of 60 bpm and 56% sensing in both atrium and ventricle and 32% pacing in the atrium sensing in the ventricle and 10% pacing in both atrium and ventricle.     LAB REVIEW:  Component 08/09/22 02/11/22 04/20/21 01/18/21   Hemoglobin A1C 6.5 High   6.5 High   6.5 Abnormal   7.1 High       Component 08/09/22 02/11/22 01/18/21 01/14/20 02/12/19 02/08/18   TSH 1.850 1.630 2.690 2.980 2.900  2.120     Component 08/09/22 02/11/22 01/18/21 01/14/20 02/12/19 02/08/18   Cholesterol, Total 175  163  153  166 141  173    Triglycerides 145  78  161 High   172 High  149  146    HDL-C 52  58 Low   45 Low   -- 44  47    VLDL 29 16 32 -- 30 29   LDL Cholesterol 94  89  76  -- 67  97      Component 08/09/22 02/11/22 01/18/21 01/18/21 01/14/20 02/12/19   Sodium 139 143 141 -- 142 142   Potassium 3.7 4.0 3.4 -- 3.8 3.4   Chloride 97 103 100 -- 101 101   CARBON DIOXIDE 31 28 31 -- 26 32   Glucose 127 High   106 High   132 High   -- 121 High  111 High     BUN 23 25 16 -- 17 14   Creatinine 0.9 1.1 1.1 -- -- 1.0    BUN/Creatinine Ratio 26 High  23 15 -- 16 14   GFRN 68 50 Low  -- -- -- --   Calcium 9.7 9.9 9.6 -- 9.7 9.8   Total Protein 6.6 6.8 7.0 -- -- 6.8   ALBUMIN 4.4 4.6 4.4 -- -- 4.6   Globulin 2.2 2.2 2.6 -- -- 2.2   Albumin/Globulin Ratio 2.0 2.1 1.7 -- 1.5 2.1   Bilirubin, Total 0.4 0.4 0.3 -- 0.4 0.4   AST 18 17 23 -- 21 20   ALT 26 23 23 -- 24 24   Alkaline Phosphatase 81 78 70 -- -- 85     CBC:   Component 02/11/22 01/18/21 01/14/20 02/12/19 02/08/18 01/30/17   WBC 9.5 9.2 7.6 8.3 7.2 6.4   RBC 4.49 4.26 4.53 4.29 4.49 4.45   HGB 13.4 12.4 13.5 12.8 13.2 --   HCT 38.9 38.0 40.7 38.8 39.5 --   MCV 86.6 89.1 90 90.4 88.0 88.0   MCH 29.8 29.1 29.8 29.8 29.5 29.0   MCHC 34.4 32.6 33.2 33.0 33.5 33.0   RDW 13.7 15.5 High  15.5 High   15.8 High  14.1 13.9   Platelet count 523 527 343 303 300 281     IMPRESSION and RECOMMENDATIONS:      1. Pacemaker 9/2011  Assessment & Plan:  Working well with remaining average longevity of 20 months. Continue remote monitoring. 2. PSVT (paroxysmal supraventricular tachycardia) (HCC)  Assessment & Plan:  Well-controlled on flecainide 50 twice daily continue the same. 3. Primary hypertension  Assessment & Plan:  Well-controlled on Tenoretic and amlodipine continue both. 4. Pure hypercholesterolemia  Assessment & Plan:  Last LDL was 94 on pravastatin 80 mg daily continue the same. Continue current cardiovascular medications which have been reviewed and discussed individually with you. Continue device check as per care link schedule. Appropriate prescriptions if needed on this visit are addressed. After visit summery is provided. Questions answered and patient verbalizes understanding. Follow up in 6 months with ECG,  sooner if needed. Juan Jose Ennis MD, 1/11/2023 11:44 AM     Please note this report has been partially produced using speech recognition software and may contain errors related to that system including errors in grammar, punctuation, and spelling, as well as words and phrases that may be inappropriate. If there are any questions or concerns please feel free to contact the dictating provider for clarification.

## 2023-03-05 PROCEDURE — 93294 REM INTERROG EVL PM/LDLS PM: CPT | Performed by: INTERNAL MEDICINE

## 2023-03-05 PROCEDURE — 93296 REM INTERROG EVL PM/IDS: CPT | Performed by: INTERNAL MEDICINE

## 2023-03-06 ENCOUNTER — PROCEDURE VISIT (OUTPATIENT)
Dept: CARDIOLOGY CLINIC | Age: 73
End: 2023-03-06
Payer: MEDICARE

## 2023-03-06 DIAGNOSIS — I49.5 SINUS NODE DYSFUNCTION (HCC): ICD-10-CM

## 2023-03-06 DIAGNOSIS — Z95.0 CARDIAC PACEMAKER IN SITU: Primary | ICD-10-CM

## 2023-04-25 ENCOUNTER — TELEPHONE (OUTPATIENT)
Dept: CARDIOLOGY CLINIC | Age: 73
End: 2023-04-25

## 2023-05-17 ENCOUNTER — HOSPITAL ENCOUNTER (OUTPATIENT)
Age: 73
Setting detail: SPECIMEN
Discharge: HOME | End: 2023-05-17

## 2023-05-17 LAB
ANION GAP SERPL CALCULATED.3IONS-SCNC: 8 MMOL/L (ref 4–16)
BUN SERPL-MCNC: 16 MG/DL (ref 6–23)
CALCIUM SERPL-MCNC: 9 MG/DL (ref 8.3–10.6)
CHLORIDE BLD-SCNC: 92 MMOL/L (ref 99–110)
CO2: 27 MMOL/L (ref 21–32)
CREAT SERPL-MCNC: 1.1 MG/DL (ref 0.6–1.1)
GFR SERPL CREATININE-BSD FRML MDRD: 53 ML/MIN/1.73M2
GLUCOSE SERPL-MCNC: 117 MG/DL (ref 70–99)
POTASSIUM SERPL-SCNC: 3.3 MMOL/L (ref 3.5–5.1)
SODIUM BLD-SCNC: 127 MMOL/L (ref 135–145)

## 2023-05-17 PROCEDURE — 80048 BASIC METABOLIC PNL TOTAL CA: CPT

## 2023-05-25 ENCOUNTER — HOSPITAL ENCOUNTER (OUTPATIENT)
Age: 73
Setting detail: SPECIMEN
Discharge: HOME | End: 2023-05-25
Payer: MEDICARE

## 2023-05-25 LAB
ANION GAP SERPL CALCULATED.3IONS-SCNC: 17 MMOL/L (ref 4–16)
BUN SERPL-MCNC: 18 MG/DL (ref 6–23)
CALCIUM SERPL-MCNC: 8.8 MG/DL (ref 8.3–10.6)
CHLORIDE BLD-SCNC: 93 MMOL/L (ref 99–110)
CO2: 25 MMOL/L (ref 21–32)
CREAT SERPL-MCNC: 1 MG/DL (ref 0.6–1.1)
GFR SERPL CREATININE-BSD FRML MDRD: 59 ML/MIN/1.73M2
GLUCOSE SERPL-MCNC: 100 MG/DL (ref 70–99)
POTASSIUM SERPL-SCNC: 3.7 MMOL/L (ref 3.5–5.1)
SODIUM BLD-SCNC: 135 MMOL/L (ref 135–145)

## 2023-05-25 PROCEDURE — 80048 BASIC METABOLIC PNL TOTAL CA: CPT

## 2023-07-12 ENCOUNTER — OFFICE VISIT (OUTPATIENT)
Dept: CARDIOLOGY CLINIC | Age: 73
End: 2023-07-12
Payer: MEDICARE

## 2023-07-12 VITALS
HEIGHT: 66 IN | BODY MASS INDEX: 36.32 KG/M2 | WEIGHT: 226 LBS | DIASTOLIC BLOOD PRESSURE: 88 MMHG | SYSTOLIC BLOOD PRESSURE: 130 MMHG | HEART RATE: 60 BPM

## 2023-07-12 DIAGNOSIS — Z95.0 PACEMAKER: ICD-10-CM

## 2023-07-12 DIAGNOSIS — I10 PRIMARY HYPERTENSION: Primary | ICD-10-CM

## 2023-07-12 DIAGNOSIS — I87.2 VENOUS INSUFFICIENCY: ICD-10-CM

## 2023-07-12 PROCEDURE — 99214 OFFICE O/P EST MOD 30 MIN: CPT | Performed by: INTERNAL MEDICINE

## 2023-07-12 PROCEDURE — 3075F SYST BP GE 130 - 139MM HG: CPT | Performed by: INTERNAL MEDICINE

## 2023-07-12 PROCEDURE — 3079F DIAST BP 80-89 MM HG: CPT | Performed by: INTERNAL MEDICINE

## 2023-07-12 PROCEDURE — 1123F ACP DISCUSS/DSCN MKR DOCD: CPT | Performed by: INTERNAL MEDICINE

## 2023-07-12 RX ORDER — POTASSIUM CHLORIDE 20 MEQ/1
20 TABLET, EXTENDED RELEASE ORAL 2 TIMES DAILY
Qty: 60 TABLET | Refills: 0 | COMMUNITY
Start: 2023-06-19 | End: 2023-07-19

## 2023-07-12 RX ORDER — CELECOXIB 100 MG/1
CAPSULE ORAL
COMMUNITY
Start: 2023-04-19

## 2023-07-12 NOTE — ASSESSMENT & PLAN NOTE
Well with remaining device longevity of average 13 months we will continue to monitor remotely and follow-up. Patient is not pacer dependent.

## 2023-07-12 NOTE — PATIENT INSTRUCTIONS
Continue current cardiovascular medications which have been reviewed and discussed individually with you. Continue device check as per care link schedule. Appropriate prescriptions if needed on this visit are addressed. After visit summery is provided. Questions answered and patient verbalizes understanding. Follow up in 6 months,  sooner if needed.

## 2023-07-12 NOTE — PROGRESS NOTES
Jimmie Grande  1950  Gifty Victor MD      Chief Complaint   Patient presents with    Hypertension    Hyperlipidemia    Tachycardia     Patient is seen for htn, hyperlipidemia, and tachycardia. Patient does get swelling in legs sometimes. Patient denies chest pain, shortness of breath, dizziness, palpitations. Patient states she has been doing some walking for exercise. Patient does not smoke. Chief complaint and HPI:  Jimmie Grande  is a 67 y.o. female following up for history of cardiac arrhythmias on flecainide therapy and hypertension and hyperlipidemia and permanent pacemaker. She had right hip surgery recently and they started her on warfarin to prevent DVT and she was admitted with severe hemoglobin of 5.9 requiring transfusions and INR was 20. Bleeding in the left psoas muscle. She has been recovering from it and had physical therapy and today is her last day of physical therapy from the hip surgery. Denies any cardiac symptoms. Did not have any chest pains or any increase in shortness of breath. She had called us with hypotension day before she was admitted. Rest of the Cardiovascular system review is otherwise unchanged from prior encounter. Past medical history:  has a past medical history of Abnormal Holter monitor finding, Cardiac arrhythmia, DVT (deep venous thrombosis) ([address]), Family history of cardiac dysrhythmia, H/O cardiovascular stress test, H/O cardiovascular stress test, H/O chest x-ray, H/O echocardiogram, Hiatal hernia with gastroesophageal reflux, History of complete ECG, History of sick sinus syndrome, HX OTHER MEDICAL, Hyperlipidemia, Hypertension, Obesity, Pacemaker, and Venous insufficiency. Past surgical history:  has a past surgical history that includes Hysterectomy; Cardiac pacemaker placement (12/12/2003); Cardiac pacemaker placement (9/29/2011); and joint replacement (5/12 & 9/12).   Social History:   Social History     Tobacco Use    Smoking status: Never

## 2023-07-12 NOTE — ASSESSMENT & PLAN NOTE
Patient has minimal swelling on the right side since her hip surgery. Counseled for compression stockings and elevating feet and walking regularly. Venous Doppler was negative for DVT.

## 2023-09-20 ENCOUNTER — TELEPHONE (OUTPATIENT)
Dept: CARDIOLOGY CLINIC | Age: 73
End: 2023-09-20

## 2023-09-21 ENCOUNTER — PROCEDURE VISIT (OUTPATIENT)
Dept: CARDIOLOGY CLINIC | Age: 73
End: 2023-09-21

## 2023-09-21 DIAGNOSIS — Z95.0 CARDIAC PACEMAKER IN SITU: Primary | ICD-10-CM

## 2023-09-21 DIAGNOSIS — I49.5 SINUS NODE DYSFUNCTION (HCC): ICD-10-CM

## 2023-11-30 ENCOUNTER — TELEPHONE (OUTPATIENT)
Dept: CARDIOLOGY CLINIC | Age: 73
End: 2023-11-30

## 2023-12-01 PROCEDURE — 93294 REM INTERROG EVL PM/LDLS PM: CPT | Performed by: INTERNAL MEDICINE

## 2023-12-01 PROCEDURE — 93296 REM INTERROG EVL PM/IDS: CPT | Performed by: INTERNAL MEDICINE

## 2023-12-04 ENCOUNTER — PROCEDURE VISIT (OUTPATIENT)
Dept: CARDIOLOGY CLINIC | Age: 73
End: 2023-12-04
Payer: MEDICARE

## 2023-12-04 DIAGNOSIS — Z95.0 CARDIAC PACEMAKER IN SITU: Primary | ICD-10-CM

## 2023-12-04 DIAGNOSIS — I49.5 SINUS NODE DYSFUNCTION (HCC): ICD-10-CM

## 2023-12-04 RX ORDER — ATENOLOL AND CHLORTHALIDONE TABLET 50; 25 MG/1; MG/1
0.5 TABLET ORAL DAILY
Qty: 45 TABLET | Refills: 3 | Status: SHIPPED | OUTPATIENT
Start: 2023-12-04

## 2023-12-06 ENCOUNTER — OFFICE VISIT (OUTPATIENT)
Dept: CARDIOLOGY CLINIC | Age: 73
End: 2023-12-06
Payer: MEDICARE

## 2023-12-06 VITALS
SYSTOLIC BLOOD PRESSURE: 130 MMHG | HEIGHT: 66 IN | BODY MASS INDEX: 37.93 KG/M2 | HEART RATE: 65 BPM | DIASTOLIC BLOOD PRESSURE: 78 MMHG | WEIGHT: 236 LBS | OXYGEN SATURATION: 98 %

## 2023-12-06 DIAGNOSIS — I10 PRIMARY HYPERTENSION: ICD-10-CM

## 2023-12-06 DIAGNOSIS — Z45.010 PACER AT END OF BATTERY LIFE: Primary | ICD-10-CM

## 2023-12-06 DIAGNOSIS — Z01.810 PRE-OPERATIVE CARDIOVASCULAR EXAMINATION: Primary | ICD-10-CM

## 2023-12-06 PROCEDURE — 3075F SYST BP GE 130 - 139MM HG: CPT | Performed by: INTERNAL MEDICINE

## 2023-12-06 PROCEDURE — 1123F ACP DISCUSS/DSCN MKR DOCD: CPT | Performed by: INTERNAL MEDICINE

## 2023-12-06 PROCEDURE — 99214 OFFICE O/P EST MOD 30 MIN: CPT | Performed by: INTERNAL MEDICINE

## 2023-12-06 PROCEDURE — 3078F DIAST BP <80 MM HG: CPT | Performed by: INTERNAL MEDICINE

## 2023-12-06 NOTE — ASSESSMENT & PLAN NOTE
Elective pacemaker generator replacement. Details were discussed potential risk and complications are outlined multiple questions were addressed. She verbalized understanding.

## 2023-12-06 NOTE — PATIENT INSTRUCTIONS
Please be informed that if you contact our office outside of normal business hours the physician on call cannot help with any scheduling or rescheduling issues, procedure instruction questions or any type of medication issue. We advise you for any urgent/emergency that you go to the nearest emergency room! PLEASE CALL OUR OFFICE DURING NORMAL BUSINESS HOURS    Monday - Friday   8 am to 5 pm    [place]: [address] [place]: [phone]    [place]:  [phone]    **It is YOUR responsibilty to bring medication bottles and/or updated medication list to [facility]. This will allow us to better serve you and all your healthcare needs**    Thank you for allowing us to care for you today! We want to ensure we can follow your treatment plan and we strive to give you the best outcomes and experience possible. If you ever have a life threatening emergency and call 911 - for an ambulance (EMS)   Our providers can only care for you at:   [facility] or [facility]. Even if you have someone take you or you drive yourself we can only care for you in a [facility] facility. Our providers are not setup at the other healthcare locations! We are committed to providing you the best care possible. If you receive a survey after visiting one of our offices, please take time to share your experience concerning your physician office visit. These surveys are confidential and no health information about you is shared. We are eager to improve for you and we are counting on your feedback to help make that happen. Pacer generator replacement on 12/14/2023 at 8 am.   Patient has the procedure described. Potential risks, complications, alternatives are discussed in detail. Questions are encouraged and addressed to patient's satisfaction. Patient verbalized understanding and asked relevant questions and agreed to proceed with the procedure. Informed consent obtained.  Bactroban ointment

## 2023-12-06 NOTE — PROGRESS NOTES
Ирина Trujillo  1950  Trudi Grande MD      Chief Complaint   Patient presents with    Follow-up    Dizziness    Shortness of Breath     Getting really worse since 2 weeks with fatigue     Chief complaint and HPI:  Ирина Trujillo  is a 67 y.o. female following up for permanent pacemaker battery depletion noted on recent check. She reports increased dyspnea on exertion started recently. Denies any dizziness syncope or near syncope. Rest of the Cardiovascular system review is otherwise unchanged from prior encounter. Past medical history:  has a past medical history of Abnormal Holter monitor finding, Cardiac arrhythmia, DVT (deep venous thrombosis) ([address]), Family history of cardiac dysrhythmia, H/O cardiovascular stress test, H/O cardiovascular stress test, H/O chest x-ray, H/O echocardiogram, Hiatal hernia with gastroesophageal reflux, History of complete ECG, History of sick sinus syndrome, HX OTHER MEDICAL, Hyperlipidemia, Hypertension, Obesity, Pacemaker, and Venous insufficiency. Past surgical history:  has a past surgical history that includes Hysterectomy; Cardiac pacemaker placement (12/12/2003); Cardiac pacemaker placement (9/29/2011); and joint replacement (5/12 & 9/12). Social History:   Social History     Tobacco Use    Smoking status: Never    Smokeless tobacco: Never   Substance Use Topics    Alcohol use: No     Alcohol/week: 0.0 standard drinks of alcohol     Comment: caffeine 2 coffees a day 1 pops a day      Family history: family history includes Arrhythmia in her father. ALLERGIES:  Sulfa antibiotics  Prior to Admission medications    Medication Sig Start Date End Date Taking?  Authorizing Provider   atenolol-chlorthalidone (TENORETIC) 50-25 MG per tablet Take 0.5 tablets by mouth daily 12/4/23  Yes Matheus Brown MD   flecainide [facility] [place]) 150 MG tablet Take 1 tablet by mouth 2 times daily 1/11/23  Yes Matheus Brown MD   pravastatin (PRAVACHOL) 80 MG tablet One daily 1/11/23

## 2023-12-07 ENCOUNTER — TELEPHONE (OUTPATIENT)
Dept: CARDIOLOGY CLINIC | Age: 73
End: 2023-12-07

## 2023-12-07 NOTE — TELEPHONE ENCOUNTER
[facility]     Dr. Casi Hickman     PROCEDURE: Generator Change Dual Chamber Pacemaker    Date of Procedure: 23 Time: 8am Arrival Time: 6am    Patient Name: Gilford Celestine  : 1950  MRN# [id number]      HOSPITAL: [facility])    Call to [facility] at [phone]  2 days before your procedure      X   If you have received orders for blood work and or a chest x-ray, please                 have  them done on assigned date at [facility], [address] [place] [address] or [facility]    X Please do not have anything by mouth after midnight or 8 hours prior to the procedure. X You may take your medications with a sip of water in the morning before your procedure or take them with you unless listed below. X Bactroban ointment bid to each nostril with Q-tip for five days prior to the   procedure and Chlorhexidine shower the day before and the morning of   the procedure as instructed. please do not take it the morning before your procedure. [facility]     Dr. Kimberlyn Vivas TO SCHEDULE:    PROCEDURE: Generator Change Dual Chamber Pacemaker    Patient Name: Gilford Celestine   : 1950   MRN# [id number]    Home Phone Number: [phone]   Weight:    Wt Readings from Last 3 Encounters:   23 107 kg (236 lb)   23 102.5 kg (226 lb)   23 102.5 kg (226 lb)        Insurance: Payor: Raj Dalyp / Plan: [employer] ESSENTIAL/PLUS / Product Type: *No Product type* /     Date of Procedure: 23 Time: 8am Arrival Time: 6am    Diagnosis:  EOL  Allergies:    Allergies   Allergen Reactions    Sulfa Antibiotics Hives

## 2023-12-08 ENCOUNTER — HOSPITAL ENCOUNTER (OUTPATIENT)
Age: 73
Discharge: HOME | End: 2023-12-08
Payer: MEDICARE

## 2023-12-08 ENCOUNTER — HOSPITAL ENCOUNTER (OUTPATIENT)
Dept: GENERAL RADIOLOGY | Age: 73
Discharge: HOME | End: 2023-12-08
Payer: MEDICARE

## 2023-12-08 DIAGNOSIS — Z01.810 PRE-OPERATIVE CARDIOVASCULAR EXAMINATION: ICD-10-CM

## 2023-12-08 LAB
ANION GAP SERPL CALCULATED.3IONS-SCNC: 14 MMOL/L (ref 4–16)
BUN SERPL-MCNC: 25 MG/DL (ref 6–23)
CALCIUM SERPL-MCNC: 9.4 MG/DL (ref 8.3–10.6)
CHLORIDE BLD-SCNC: 99 MMOL/L (ref 99–110)
CO2: 25 MMOL/L (ref 21–32)
CREAT SERPL-MCNC: 1.4 MG/DL (ref 0.6–1.1)
GFR SERPL CREATININE-BSD FRML MDRD: 40 ML/MIN/1.73M2
GLUCOSE SERPL-MCNC: 112 MG/DL (ref 70–99)
HCT VFR BLD CALC: 33.5 % (ref 37–47)
HEMOGLOBIN: 10.4 GM/DL (ref 12.5–16)
MCH RBC QN AUTO: 26.9 PG (ref 27–31)
MCHC RBC AUTO-ENTMCNC: 31 % (ref 32–36)
MCV RBC AUTO: 86.6 FL (ref 78–100)
PDW BLD-RTO: 16.9 % (ref 11.7–14.9)
PLATELET # BLD: 324 K/CU MM (ref 140–440)
PMV BLD AUTO: 9.7 FL (ref 7.5–11.1)
POTASSIUM SERPL-SCNC: 3.6 MMOL/L (ref 3.5–5.1)
RBC # BLD: 3.87 M/CU MM (ref 4.2–5.4)
SODIUM BLD-SCNC: 138 MMOL/L (ref 135–145)
WBC # BLD: 10.9 K/CU MM (ref 4–10.5)

## 2023-12-08 PROCEDURE — 85027 COMPLETE CBC AUTOMATED: CPT

## 2023-12-08 PROCEDURE — 71046 X-RAY EXAM CHEST 2 VIEWS: CPT

## 2023-12-08 PROCEDURE — 80048 BASIC METABOLIC PNL TOTAL CA: CPT

## 2023-12-08 PROCEDURE — 36415 COLL VENOUS BLD VENIPUNCTURE: CPT

## 2023-12-11 RX ORDER — CHLORHEXIDINE GLUCONATE 40 MG/ML
SOLUTION TOPICAL
Qty: 236 ML | Refills: 0 | Status: ON HOLD | OUTPATIENT
Start: 2023-12-11 | End: 2023-12-14 | Stop reason: HOSPADM

## 2023-12-14 ENCOUNTER — HOSPITAL ENCOUNTER (OUTPATIENT)
Age: 73
Setting detail: OUTPATIENT SURGERY
Discharge: HOME | End: 2023-12-14
Attending: INTERNAL MEDICINE | Admitting: INTERNAL MEDICINE
Payer: MEDICARE

## 2023-12-14 VITALS
HEIGHT: 66 IN | SYSTOLIC BLOOD PRESSURE: 146 MMHG | HEART RATE: 60 BPM | TEMPERATURE: 96.8 F | RESPIRATION RATE: 18 BRPM | BODY MASS INDEX: 36.96 KG/M2 | DIASTOLIC BLOOD PRESSURE: 73 MMHG | WEIGHT: 230 LBS | OXYGEN SATURATION: 100 %

## 2023-12-14 DIAGNOSIS — Z45.010 PACEMAKER AT END OF BATTERY LIFE: ICD-10-CM

## 2023-12-14 LAB
ECHO BSA: 2.2 M2
GLUCOSE BLD-MCNC: 140 MG/DL (ref 70–99)

## 2023-12-14 PROCEDURE — 2580000003 HC RX 258: Performed by: INTERNAL MEDICINE

## 2023-12-14 PROCEDURE — 2500000003 HC RX 250 WO HCPCS: Performed by: INTERNAL MEDICINE

## 2023-12-14 PROCEDURE — 82962 GLUCOSE BLOOD TEST: CPT

## 2023-12-14 PROCEDURE — 33213 INSERT PULSE GEN DUAL LEADS: CPT | Performed by: INTERNAL MEDICINE

## 2023-12-14 PROCEDURE — 7100000010 HC PHASE II RECOVERY - FIRST 15 MIN: Performed by: INTERNAL MEDICINE

## 2023-12-14 PROCEDURE — A4217 STERILE WATER/SALINE, 500 ML: HCPCS | Performed by: INTERNAL MEDICINE

## 2023-12-14 PROCEDURE — C1785 PMKR, DUAL, RATE-RESP: HCPCS | Performed by: INTERNAL MEDICINE

## 2023-12-14 PROCEDURE — 6360000002 HC RX W HCPCS: Performed by: INTERNAL MEDICINE

## 2023-12-14 PROCEDURE — 6360000002 HC RX W HCPCS

## 2023-12-14 PROCEDURE — 2500000003 HC RX 250 WO HCPCS

## 2023-12-14 PROCEDURE — 2709999900 HC NON-CHARGEABLE SUPPLY: Performed by: INTERNAL MEDICINE

## 2023-12-14 PROCEDURE — 7100000011 HC PHASE II RECOVERY - ADDTL 15 MIN: Performed by: INTERNAL MEDICINE

## 2023-12-14 DEVICE — IPG W1DR01 AZURE XT DR MRI USA
Type: IMPLANTABLE DEVICE | Status: FUNCTIONAL
Brand: AZURE™ XT DR MRI SURESCAN™

## 2023-12-14 RX ORDER — CEFAZOLIN SODIUM 1 G/3ML
INJECTION, POWDER, FOR SOLUTION INTRAMUSCULAR; INTRAVENOUS PRN
Status: DISCONTINUED | OUTPATIENT
Start: 2023-12-14 | End: 2023-12-14 | Stop reason: HOSPADM

## 2023-12-14 RX ORDER — SODIUM CHLORIDE 0.9 % (FLUSH) 0.9 %
5-40 SYRINGE (ML) INJECTION EVERY 12 HOURS SCHEDULED
Status: DISCONTINUED | OUTPATIENT
Start: 2023-12-14 | End: 2023-12-14 | Stop reason: HOSPADM

## 2023-12-14 RX ORDER — SODIUM CHLORIDE 9 MG/ML
INJECTION, SOLUTION INTRAVENOUS CONTINUOUS
Status: DISCONTINUED | OUTPATIENT
Start: 2023-12-14 | End: 2023-12-14 | Stop reason: HOSPADM

## 2023-12-14 RX ORDER — SODIUM CHLORIDE 9 MG/ML
INJECTION, SOLUTION INTRAVENOUS PRN
Status: DISCONTINUED | OUTPATIENT
Start: 2023-12-14 | End: 2023-12-14 | Stop reason: HOSPADM

## 2023-12-14 RX ORDER — SODIUM CHLORIDE 0.9 % (FLUSH) 0.9 %
5-40 SYRINGE (ML) INJECTION PRN
Status: DISCONTINUED | OUTPATIENT
Start: 2023-12-14 | End: 2023-12-14 | Stop reason: HOSPADM

## 2023-12-14 NOTE — PLAN OF CARE
All discharge instructions explained to the patient at this time. No bleeding or hematoma noted along site. Patient walked to the bathroom without difficulty and IV removed per discharge orders. Patient denies any additional needs and all vitals stable for discharge home.  Pressure dressing was removed per protocol from left upper chest. Flora Banuelos RN 12/14/2023

## 2023-12-14 NOTE — PROGRESS NOTES
Contacted [facility] and spoke with Dr. Keren Howell.  Follow up appt moved from 12/21/23 at 1140 to 12/26/23 at 1140

## 2023-12-14 NOTE — DISCHARGE INSTRUCTIONS
Keep dressing dry and intact. May shower from the waist down. If dressing would come loose, call the office for further instructions.   Call Dr Sabino Huang if you have any signs of infection (fever, redness to skin, any yellowish or greenish drainage)  Follow up with Dr Sabino Huang for suture removal.

## 2023-12-14 NOTE — PROGRESS NOTES
12/14/23 0732   Encounter Summary   Encounter Overview/Reason  Pre-Procedural   Service Provided For: Family   Referral/Consult From: [facility]   Support System Spouse   Last Encounter  12/14/23  (Companioned with spouse and shared in prayer.   No other needs at this time.)   Complexity of Encounter Low   Begin Time 0654   End Time  0656   Total Time Calculated 2 min   Spiritual/Emotional needs   Type Spiritual Support   Assessment/Intervention/Outcome   Assessment Calm;Coping   Intervention Active listening;Explored/Affirmed feelings, thoughts, concerns;Nurtured Hope;Prayer (assurance of)/[place];Sustaining Presence/Ministry of presence   Outcome Comfort;Expressed Gratitude   Plan and Referrals   Plan/Referrals Continue Support (comment)  (Family informed how to contact )

## 2023-12-26 ENCOUNTER — NURSE ONLY (OUTPATIENT)
Dept: CARDIOLOGY CLINIC | Age: 73
End: 2023-12-26

## 2023-12-26 VITALS
HEART RATE: 64 BPM | HEIGHT: 66 IN | DIASTOLIC BLOOD PRESSURE: 90 MMHG | OXYGEN SATURATION: 96 % | WEIGHT: 250 LBS | SYSTOLIC BLOOD PRESSURE: 160 MMHG | RESPIRATION RATE: 16 BRPM | BODY MASS INDEX: 40.18 KG/M2

## 2023-12-26 DIAGNOSIS — R06.02 SHORT OF BREATH ON EXERTION: Primary | ICD-10-CM

## 2023-12-26 DIAGNOSIS — Z45.010 PACEMAKER AT END OF BATTERY LIFE: ICD-10-CM

## 2023-12-26 DIAGNOSIS — I10 PRIMARY HYPERTENSION: ICD-10-CM

## 2023-12-26 RX ORDER — AMLODIPINE BESYLATE 10 MG/1
10 TABLET ORAL DAILY
Qty: 90 TABLET | Refills: 3 | Status: SHIPPED | OUTPATIENT
Start: 2023-12-26

## 2023-12-26 NOTE — PROGRESS NOTES
Patient here for post pacer site check. Dressing removed, Incision site with no redness, swelling or drainage. Sutures removed per protocol. Edges well approximated. Site cleaned and steri strips applied. Patient had some redness from where Tegaderm was and itching states has been using neosporin. Denies pain or discomfort. Patient to f/u as directed and call with any c/o. Patient voiced understanding.

## 2023-12-27 ENCOUNTER — TELEPHONE (OUTPATIENT)
Dept: CARDIOLOGY CLINIC | Age: 73
End: 2023-12-27

## 2023-12-27 ENCOUNTER — HOSPITAL ENCOUNTER (OUTPATIENT)
Age: 73
Discharge: HOME | End: 2023-12-27
Payer: MEDICARE

## 2023-12-27 DIAGNOSIS — R06.02 SHORT OF BREATH ON EXERTION: ICD-10-CM

## 2023-12-27 LAB
D DIMER: 1.05 UG/ML (FEU)
PRO-BNP: 2000 PG/ML

## 2023-12-27 PROCEDURE — 85379 FIBRIN DEGRADATION QUANT: CPT

## 2023-12-27 PROCEDURE — 36415 COLL VENOUS BLD VENIPUNCTURE: CPT

## 2023-12-27 PROCEDURE — 83880 ASSAY OF NATRIURETIC PEPTIDE: CPT

## 2023-12-27 NOTE — TELEPHONE ENCOUNTER
Called patient to provide her with results, patient verbalized understanding. Also, gave her her echo results. No evidence of deep vein and superficial thrombosis in the right lower extremity. Common Femoral Vein: Patent, normal phasicity, spontaneous, normal augmentation, compressible. Greater Saphenous Vein: Vessel patent, compressible  Femoral Vein: Patent, normal phasicity, spontaneous, normal augmentation, compressible  Popliteal Vein: Patent, normal phasicity, spontaneous, normal augmentation, compressible. Posterior Tibial Vein: Not well visualized.

## 2023-12-27 NOTE — TELEPHONE ENCOUNTER
12/26/23 ECHO        Left Ventricle: Normal left ventricular systolic function with a visually estimated EF of 55 - 60%. Left ventricle size is normal. Normal wall thickness. Normal wall motion. Tricuspid Valve: Mild to moderate regurgitation. Normal RVSP. The estimated RVSP is 23 mmHg. Pericardium: No pericardial effusion.        12/26/23Vascular US Duplex Lower Extremity Venous Right

## 2023-12-28 ENCOUNTER — TELEPHONE (OUTPATIENT)
Dept: CARDIOLOGY CLINIC | Age: 73
End: 2023-12-28

## 2023-12-28 DIAGNOSIS — R06.09 DYSPNEA ON EXERTION: Primary | ICD-10-CM

## 2023-12-28 NOTE — TELEPHONE ENCOUNTER
Called patient to speak with her regarding her lab work and what the next steps are, answered patients questions, and she verbalized understanding.

## 2023-12-28 NOTE — TELEPHONE ENCOUNTER
Spk to pt and informed her that Petty placed an order for a pulmonary CTA b/c her D dimer was elevated and she wanted to rule out a pulmonary emboli.

## 2023-12-28 NOTE — TELEPHONE ENCOUNTER
----- Message from MICHAEL Latham - CNP sent at 12/28/2023  8:12 AM EST -----  Please inform patient that her D dimer is elevated - concern to pulmonary emboli- I placed an order for stat CTA

## 2023-12-28 NOTE — TELEPHONE ENCOUNTER
----- Message from MICHAEL Ahumdaa CNP sent at 12/28/2023  8:14 AM EST -----  Please inform patient that I placed an order for a pulmonary CTA : her D dimer is elevated and I want to rule out a pulmonary emboli

## 2024-01-03 ENCOUNTER — TELEPHONE (OUTPATIENT)
Dept: CARDIOLOGY CLINIC | Age: 74
End: 2024-01-03

## 2024-01-03 NOTE — TELEPHONE ENCOUNTER
Pt seen 12/26 for B/P check.  Brought readings to office:  12/29  161/84, HR 68   147/86  HR 76  12/30  138/80  HR 75      12/31  156/88  HR 67    155/80  HR 74  1/1      157/79  HR 73   155/58  HR 69  1/2       160/83  HR 73   154/81  HR 67

## 2024-01-04 ENCOUNTER — TELEPHONE (OUTPATIENT)
Dept: CARDIOLOGY CLINIC | Age: 74
End: 2024-01-04

## 2024-01-04 ENCOUNTER — HOSPITAL ENCOUNTER (OUTPATIENT)
Dept: CT IMAGING | Age: 74
Discharge: HOME | End: 2024-01-04
Payer: MEDICARE

## 2024-01-04 DIAGNOSIS — R06.09 DYSPNEA ON EXERTION: ICD-10-CM

## 2024-01-04 PROCEDURE — 71275 CT ANGIOGRAPHY CHEST: CPT

## 2024-01-04 PROCEDURE — 6360000004 HC RX CONTRAST MEDICATION: Performed by: NURSE PRACTITIONER

## 2024-01-04 RX ADMIN — IOPAMIDOL 75 ML: 755 INJECTION, SOLUTION INTRAVENOUS at 10:45

## 2024-01-04 NOTE — TELEPHONE ENCOUNTER
Called patient to provide her with results. Patient verbalized understanding and scheduled an appointment to come in and talk to Petty.     TECHNIQUE:  CTA of the chest was performed after the administration of intravenous  contrast.  Multiplanar reformatted images are provided for review.  MIP  images are provided for review. Automated exposure control, iterative  reconstruction, and/or weight based adjustment of the mA/kV was utilized to  reduce the radiation dose to as low as reasonably achievable.     COMPARISON:  Radiograph 12/08/2023.     HISTORY:  ORDERING SYSTEM PROVIDED HISTORY: Dyspnea on exertion  TECHNOLOGIST PROVIDED HISTORY:  Additional Contrast?->None  STAT Creatinine as needed:->Yes  Reason for Exam: Dyspnea on exertion  Additional signs and symptoms: 75ml oisovue 370 @ lt anti @ 1035hrs, gfr40,  creat 1.4 12-8-2023,Dyspnea on exertion,     FINDINGS:  Pulmonary Arteries: Pulmonary arteries are adequately opacified for  evaluation.  No evidence of intraluminal filling defect to suggest pulmonary  embolism.  Main pulmonary artery is normal in caliber.     Mediastinum: No evidence of mediastinal lymphadenopathy.  The heart and  pericardium demonstrate no acute abnormality.  There is mild cardiomegaly.  Pacemaker lead placement in the right ventricle.  There is no acute  abnormality of the thoracic aorta.     Lungs/pleura: Left upper lobe linear atelectasis is seen.  Thin walled  cavitary finding with peripheral calcification in the left upper lobe 8 mm in  size (series 2 image 149).  No focal consolidation or pulmonary edema.  No  evidence of pleural effusion or pneumothorax.     Upper Abdomen: Limited images of the upper abdomen are unremarkable.     Soft Tissues/Bones: No acute bone or soft tissue abnormality.     IMPRESSION:  1. No evidence of pulmonary embolism or acute cardiopulmonary process.  2. Thin-walled cavitary finding with peripheral calcification in the left  upper lobe measuring 8 mm in

## 2024-01-10 ENCOUNTER — OFFICE VISIT (OUTPATIENT)
Dept: CARDIOLOGY CLINIC | Age: 74
End: 2024-01-10
Payer: MEDICARE

## 2024-01-10 VITALS
WEIGHT: 237 LBS | OXYGEN SATURATION: 98 % | DIASTOLIC BLOOD PRESSURE: 80 MMHG | HEIGHT: 66 IN | SYSTOLIC BLOOD PRESSURE: 140 MMHG | BODY MASS INDEX: 38.09 KG/M2 | HEART RATE: 67 BPM

## 2024-01-10 DIAGNOSIS — I10 PRIMARY HYPERTENSION: Primary | ICD-10-CM

## 2024-01-10 PROCEDURE — 3077F SYST BP >= 140 MM HG: CPT | Performed by: NURSE PRACTITIONER

## 2024-01-10 PROCEDURE — 3079F DIAST BP 80-89 MM HG: CPT | Performed by: NURSE PRACTITIONER

## 2024-01-10 PROCEDURE — 99214 OFFICE O/P EST MOD 30 MIN: CPT | Performed by: NURSE PRACTITIONER

## 2024-01-10 PROCEDURE — 1123F ACP DISCUSS/DSCN MKR DOCD: CPT | Performed by: NURSE PRACTITIONER

## 2024-01-10 RX ORDER — ATENOLOL 50 MG/1
50 TABLET ORAL DAILY
Qty: 30 TABLET | Refills: 3 | Status: SHIPPED | OUTPATIENT
Start: 2024-01-10

## 2024-01-10 RX ORDER — FUROSEMIDE 20 MG/1
20 TABLET ORAL DAILY
Qty: 30 TABLET | Refills: 3 | Status: SHIPPED | OUTPATIENT
Start: 2024-01-10

## 2024-01-10 ASSESSMENT — ENCOUNTER SYMPTOMS
ORTHOPNEA: 0
SHORTNESS OF BREATH: 0

## 2024-01-10 NOTE — PATIENT INSTRUCTIONS
Please be informed that if you contact our office outside of normal business hours the physician on call cannot help with any scheduling or rescheduling issues, procedure instruction questions or any type of medication issue.    We advise you for any urgent/emergency that you go to the nearest emergency room!    PLEASE CALL OUR OFFICE DURING NORMAL BUSINESS HOURS    Monday - Friday   8 am to 5 pm    [place]: [phone]    [place]: [phone]    [place]:  [phone]  **It is YOUR responsibilty to bring medication bottles and/or updated medication list to EACH APPOINTMENT. This will allow us to better serve you and all your healthcare needs**  Thank you for allowing us to care for you today!   We want to ensure we can follow your treatment plan and we strive to give you the best outcomes and experience possible.   If you ever have a life threatening emergency and call 911 - for an ambulance (EMS)   Our providers can only care for you at:   [facility] or [facility].   Even if you have someone take you or you drive yourself we can only care for you in a [facility] facility. Our providers are not setup at the other healthcare locations!   We are committed to providing you the best care possible.    If you receive a survey after visiting one of our offices, please take time to share your experience concerning your physician office visit.  These surveys are confidential and no health information about you is shared.    We are eager to improve for you and we are counting on your feedback to help make that happen.

## 2024-01-10 NOTE — PROGRESS NOTES
1/10/2024  Primary cardiologist: Dr. Machado    CC:   Nazia  is an established 73 y.o.  female here for a 1 month follow up on testing and shortness of breath       SUBJECTIVE/OBJECTIVE:  Nazia is a 73 y.o. female with a history of symptomatic bradycardia s/p dual-chamber pacemaker, hypertension, hyperlipidemia and  DVT      HPI :   Nazia reports she continues with shortness of breath and edema. It has improved with change in meds.     Review of Systems   Constitutional: Negative for diaphoresis and malaise/fatigue.   Cardiovascular:  Positive for dyspnea on exertion and leg swelling. Negative for chest pain, claudication, irregular heartbeat, near-syncope, orthopnea, palpitations and paroxysmal nocturnal dyspnea.   Respiratory:  Negative for shortness of breath.    Neurological:  Negative for dizziness and light-headedness.       Vitals:    01/10/24 1140   BP: (!) 140/80   Site: Left Upper Arm   Position: Sitting   Cuff Size: Large Adult   Pulse: 67   SpO2: 98%   Weight: 107.5 kg (237 lb)   Height: 1.676 m (5' 6\")     Wt Readings from Last 3 Encounters:   01/10/24 107.5 kg (237 lb)   12/26/23 113.4 kg (250 lb)   12/26/23 113.4 kg (250 lb)      Body mass index is 38.25 kg/m².     Physical Exam  Vitals reviewed.   Constitutional:       Appearance: She is obese.   Eyes:      Pupils: Pupils are equal, round, and reactive to light.   Neck:      Vascular: No carotid bruit.   Cardiovascular:      Rate and Rhythm: Normal rate and regular rhythm.      Pulses: Normal pulses.   Pulmonary:      Effort: Pulmonary effort is normal.      Breath sounds: Normal breath sounds. No rales.   Chest:      Chest wall: No tenderness.   Musculoskeletal:      Cervical back: No tenderness.      Right lower leg: Edema present.      Left lower leg: No edema.   Skin:     General: Skin is warm and dry.      Capillary Refill: Capillary refill takes less than 2 seconds.   Neurological:      Mental Status: She is alert and oriented to person, place,

## 2024-01-23 LAB
BUN BLDV-MCNC: 22 MG/DL
CALCIUM SERPL-MCNC: 10.9 MG/DL
CHLORIDE BLD-SCNC: 102 MMOL/L
CO2: 31 MMOL/L
CREAT SERPL-MCNC: 1.3 MG/DL
EGFR: NORMAL
GLUCOSE BLD-MCNC: 102 MG/DL
POTASSIUM SERPL-SCNC: 3.9 MMOL/L
SODIUM BLD-SCNC: 142 MMOL/L

## 2024-01-24 ENCOUNTER — NURSE ONLY (OUTPATIENT)
Dept: CARDIOLOGY CLINIC | Age: 74
End: 2024-01-24

## 2024-01-24 ENCOUNTER — TELEPHONE (OUTPATIENT)
Dept: CARDIOLOGY CLINIC | Age: 74
End: 2024-01-24

## 2024-01-24 DIAGNOSIS — I10 PRIMARY HYPERTENSION: Primary | ICD-10-CM

## 2024-01-24 RX ORDER — FUROSEMIDE 40 MG/1
40 TABLET ORAL DAILY
Qty: 30 TABLET | Refills: 4 | Status: SHIPPED | OUTPATIENT
Start: 2024-01-24

## 2024-01-24 RX ORDER — POTASSIUM CHLORIDE 20 MEQ/1
20 TABLET, EXTENDED RELEASE ORAL DAILY
Qty: 30 TABLET | Refills: 4 | Status: SHIPPED | OUTPATIENT
Start: 2024-01-24

## 2024-01-24 NOTE — TELEPHONE ENCOUNTER
Richar Wen NP:  Bp is up - increase lasix to 40 mg a day  add 20 meq of potassium a day   Recheck labs in 1 month

## 2024-01-24 NOTE — PROGRESS NOTES
Labs noted    Latest Reference Range & Units 01/23/24 00:00   Sodium mmol/L 142 (E)   Potassium mmol/L 3.9 (E)   Chloride mmol/L 102 (E)   CO2 mmol/L 31 (E)   BUN,BUNPL mg/dL 22 (E)   Creatinine  1.3 (E)   (E): External lab result    BP noted - see media  Bp is up - increase lasix to 40 mg a day  add 20 meq of potassium a day   Recheck labs in 1 month

## 2024-02-20 LAB
BUN BLDV-MCNC: 27 MG/DL
CALCIUM SERPL-MCNC: 10.4 MG/DL
CHLORIDE BLD-SCNC: 102 MMOL/L
CO2: 29 MMOL/L
CREAT SERPL-MCNC: 1.3 MG/DL
EGFR: NORMAL
GLUCOSE BLD-MCNC: 115 MG/DL
POTASSIUM SERPL-SCNC: 4.2 MMOL/L
SODIUM BLD-SCNC: 141 MMOL/L

## 2024-02-21 RX ORDER — ATENOLOL 100 MG/1
100 TABLET ORAL DAILY
Qty: 90 TABLET | Refills: 3 | Status: SHIPPED | OUTPATIENT
Start: 2024-02-21

## 2024-03-17 PROCEDURE — 93296 REM INTERROG EVL PM/IDS: CPT | Performed by: INTERNAL MEDICINE

## 2024-03-17 PROCEDURE — 93294 REM INTERROG EVL PM/LDLS PM: CPT | Performed by: INTERNAL MEDICINE

## 2024-03-18 ENCOUNTER — PROCEDURE VISIT (OUTPATIENT)
Dept: CARDIOLOGY CLINIC | Age: 74
End: 2024-03-18
Payer: MEDICARE

## 2024-03-18 DIAGNOSIS — I49.5 SINUS NODE DYSFUNCTION (HCC): ICD-10-CM

## 2024-03-18 DIAGNOSIS — Z95.0 CARDIAC PACEMAKER IN SITU: Primary | ICD-10-CM

## 2024-03-29 RX ORDER — CARVEDILOL 25 MG/1
25 TABLET ORAL 2 TIMES DAILY
Qty: 180 TABLET | Refills: 1 | Status: SHIPPED | OUTPATIENT
Start: 2024-03-29

## 2024-03-29 NOTE — TELEPHONE ENCOUNTER
Per STEF Wen NP: she dropped of BP numbers- average is 150/- stop atenolol - change to coreg 25 mg bid

## 2024-04-22 RX ORDER — PRAVASTATIN SODIUM 80 MG/1
TABLET ORAL
Qty: 90 TABLET | Refills: 3 | Status: SHIPPED | OUTPATIENT
Start: 2024-04-22

## 2024-04-22 RX ORDER — FLECAINIDE ACETATE 150 MG/1
150 TABLET ORAL 2 TIMES DAILY
Qty: 180 TABLET | Refills: 3 | Status: SHIPPED | OUTPATIENT
Start: 2024-04-22

## 2024-05-20 RX ORDER — LOSARTAN POTASSIUM 25 MG/1
25 TABLET ORAL DAILY
Qty: 90 TABLET | Refills: 1 | Status: SHIPPED | OUTPATIENT
Start: 2024-05-20

## 2024-06-19 RX ORDER — FUROSEMIDE 40 MG/1
40 TABLET ORAL DAILY
Qty: 30 TABLET | Refills: 5 | Status: SHIPPED | OUTPATIENT
Start: 2024-06-19

## 2024-06-19 RX ORDER — POTASSIUM CHLORIDE 20 MEQ/1
20 TABLET, EXTENDED RELEASE ORAL DAILY
Qty: 30 TABLET | Refills: 5 | Status: SHIPPED | OUTPATIENT
Start: 2024-06-19

## 2024-06-22 PROCEDURE — 93296 REM INTERROG EVL PM/IDS: CPT | Performed by: INTERNAL MEDICINE

## 2024-06-22 PROCEDURE — 93294 REM INTERROG EVL PM/LDLS PM: CPT | Performed by: INTERNAL MEDICINE

## 2024-06-25 ENCOUNTER — PROCEDURE VISIT (OUTPATIENT)
Dept: CARDIOLOGY CLINIC | Age: 74
End: 2024-06-25
Payer: MEDICARE

## 2024-06-25 DIAGNOSIS — Z95.0 CARDIAC PACEMAKER IN SITU: Primary | ICD-10-CM

## 2024-06-25 DIAGNOSIS — I49.5 SINUS NODE DYSFUNCTION (HCC): ICD-10-CM

## 2024-07-10 ENCOUNTER — OFFICE VISIT (OUTPATIENT)
Dept: CARDIOLOGY CLINIC | Age: 74
End: 2024-07-10
Payer: MEDICARE

## 2024-07-10 VITALS
HEART RATE: 69 BPM | DIASTOLIC BLOOD PRESSURE: 74 MMHG | WEIGHT: 224 LBS | OXYGEN SATURATION: 98 % | SYSTOLIC BLOOD PRESSURE: 126 MMHG | HEIGHT: 66 IN | BODY MASS INDEX: 36 KG/M2

## 2024-07-10 DIAGNOSIS — E78.00 PURE HYPERCHOLESTEROLEMIA: ICD-10-CM

## 2024-07-10 DIAGNOSIS — I10 PRIMARY HYPERTENSION: ICD-10-CM

## 2024-07-10 DIAGNOSIS — I47.10 PSVT (PAROXYSMAL SUPRAVENTRICULAR TACHYCARDIA) (HCC): ICD-10-CM

## 2024-07-10 DIAGNOSIS — Z95.0 PACEMAKER: Primary | ICD-10-CM

## 2024-07-10 PROBLEM — I20.89 ANGINA OF EFFORT (HCC): Status: RESOLVED | Noted: 2019-03-13 | Resolved: 2024-07-10

## 2024-07-10 PROBLEM — Z45.010 PACER AT END OF BATTERY LIFE: Status: RESOLVED | Noted: 2023-12-06 | Resolved: 2024-07-10

## 2024-07-10 PROCEDURE — 3078F DIAST BP <80 MM HG: CPT | Performed by: INTERNAL MEDICINE

## 2024-07-10 PROCEDURE — 99214 OFFICE O/P EST MOD 30 MIN: CPT | Performed by: INTERNAL MEDICINE

## 2024-07-10 PROCEDURE — 3074F SYST BP LT 130 MM HG: CPT | Performed by: INTERNAL MEDICINE

## 2024-07-10 PROCEDURE — 1123F ACP DISCUSS/DSCN MKR DOCD: CPT | Performed by: INTERNAL MEDICINE

## 2024-07-10 RX ORDER — LOSARTAN POTASSIUM 25 MG/1
25 TABLET ORAL DAILY
Qty: 90 TABLET | Refills: 1 | Status: SHIPPED | OUTPATIENT
Start: 2024-07-10

## 2024-07-10 RX ORDER — FLECAINIDE ACETATE 150 MG/1
150 TABLET ORAL 2 TIMES DAILY
Qty: 180 TABLET | Refills: 3 | Status: SHIPPED | OUTPATIENT
Start: 2024-07-10

## 2024-07-10 RX ORDER — TRAMADOL HYDROCHLORIDE 50 MG/1
50 TABLET ORAL 2 TIMES DAILY
COMMUNITY

## 2024-07-10 RX ORDER — AMLODIPINE BESYLATE 10 MG/1
10 TABLET ORAL DAILY
Qty: 90 TABLET | Refills: 3 | Status: SHIPPED | OUTPATIENT
Start: 2024-07-10

## 2024-07-10 RX ORDER — NAPROXEN 500 MG/1
500 TABLET ORAL DAILY
COMMUNITY

## 2024-07-10 NOTE — ASSESSMENT & PLAN NOTE
Fairly well-controlled on current combination of medication including amlodipine, losartan and carvedilol continue the same.

## 2024-07-10 NOTE — PROGRESS NOTES
Nazia Sarmiento  1950  Birdie Barber MD      Chief Complaint   Patient presents with    6 Month Follow-Up     Pt states edema is on medication for it , pt states no other cardiac sx      Chief complaint and HPI:  Nazia Sarmiento  is a 73 y.o. female following up for history of sick sinus syndrome PSVT has hypertension and hyperlipidemia and had her pacemaker replaced December last year and has not seen me since then.  She had issues with fluid retention diastolic heart failure was seen in CHF perspective and her and has been doing well from the point of view.  She is not sure why she was retaining so much fluid and she was having swelling of the legs she had a CT chest negative for pulmonary embolism however it showed a cavity in the left upper lung and she is not aware of this diagnosis.  She has history of COVID infection in June of last year and had bad pneumonia.  She has a follow-up with primary care next week.  Medications are reviewed and reconciled and multiple encounters since last visit with me are reviewed and discussed with her.    Rest of the Cardiovascular system review is otherwise unchanged from prior encounter.  Past medical history:  has a past medical history of Abnormal Holter monitor finding, Cardiac arrhythmia, DVT (deep venous thrombosis) ([facility]), Family history of cardiac dysrhythmia, H/O cardiovascular stress test, H/O cardiovascular stress test, H/O chest x-ray, H/O echocardiogram, Hiatal hernia with gastroesophageal reflux, History of complete ECG, History of sick sinus syndrome, HX OTHER MEDICAL, Hyperlipidemia, Hypertension, Obesity, Pacemaker, and Venous insufficiency.  Past surgical history:  has a past surgical history that includes Hysterectomy; Cardiac pacemaker placement (12/12/2003); Cardiac pacemaker placement (9/29/2011); joint replacement (5/12 & 9/12); and ep device procedure (N/A, 12/14/2023).  Social History:   Social History     Tobacco Use    Smoking status: Never

## 2024-08-19 ENCOUNTER — TELEPHONE (OUTPATIENT)
Dept: CARDIOLOGY CLINIC | Age: 74
End: 2024-08-19

## 2024-09-16 RX ORDER — CARVEDILOL 25 MG/1
25 TABLET ORAL 2 TIMES DAILY
Qty: 180 TABLET | Refills: 1 | Status: SHIPPED | OUTPATIENT
Start: 2024-09-16

## 2024-10-01 PROCEDURE — 93296 REM INTERROG EVL PM/IDS: CPT | Performed by: INTERNAL MEDICINE

## 2024-10-01 PROCEDURE — 93294 REM INTERROG EVL PM/LDLS PM: CPT | Performed by: INTERNAL MEDICINE

## 2024-10-02 RX ORDER — CARVEDILOL 25 MG/1
25 TABLET ORAL 2 TIMES DAILY
Qty: 180 TABLET | Refills: 1 | Status: SHIPPED | OUTPATIENT
Start: 2024-10-02

## 2024-12-16 RX ORDER — FUROSEMIDE 40 MG/1
40 TABLET ORAL DAILY
Qty: 30 TABLET | Refills: 5 | Status: SHIPPED | OUTPATIENT
Start: 2024-12-16

## 2025-02-26 ENCOUNTER — OFFICE VISIT (OUTPATIENT)
Dept: CARDIOLOGY CLINIC | Age: 75
End: 2025-02-26
Payer: MEDICARE

## 2025-02-26 VITALS
OXYGEN SATURATION: 97 % | BODY MASS INDEX: 36 KG/M2 | WEIGHT: 224 LBS | DIASTOLIC BLOOD PRESSURE: 82 MMHG | SYSTOLIC BLOOD PRESSURE: 128 MMHG | HEART RATE: 61 BPM | HEIGHT: 66 IN

## 2025-02-26 DIAGNOSIS — I10 PRIMARY HYPERTENSION: Primary | ICD-10-CM

## 2025-02-26 DIAGNOSIS — I47.10 PSVT (PAROXYSMAL SUPRAVENTRICULAR TACHYCARDIA): ICD-10-CM

## 2025-02-26 DIAGNOSIS — E78.00 PURE HYPERCHOLESTEROLEMIA: ICD-10-CM

## 2025-02-26 DIAGNOSIS — Z95.0 PACEMAKER: ICD-10-CM

## 2025-02-26 PROCEDURE — 1123F ACP DISCUSS/DSCN MKR DOCD: CPT | Performed by: INTERNAL MEDICINE

## 2025-02-26 PROCEDURE — 99214 OFFICE O/P EST MOD 30 MIN: CPT | Performed by: INTERNAL MEDICINE

## 2025-02-26 PROCEDURE — 3074F SYST BP LT 130 MM HG: CPT | Performed by: INTERNAL MEDICINE

## 2025-02-26 PROCEDURE — 93000 ELECTROCARDIOGRAM COMPLETE: CPT | Performed by: INTERNAL MEDICINE

## 2025-02-26 PROCEDURE — 3079F DIAST BP 80-89 MM HG: CPT | Performed by: INTERNAL MEDICINE

## 2025-02-26 RX ORDER — FLECAINIDE ACETATE 150 MG/1
150 TABLET ORAL 2 TIMES DAILY
Qty: 180 TABLET | Refills: 3 | Status: SHIPPED | OUTPATIENT
Start: 2025-02-26

## 2025-02-26 RX ORDER — FUROSEMIDE 40 MG/1
40 TABLET ORAL DAILY
Qty: 90 TABLET | Refills: 3 | Status: SHIPPED | OUTPATIENT
Start: 2025-02-26

## 2025-02-26 RX ORDER — AMLODIPINE BESYLATE 10 MG/1
10 TABLET ORAL DAILY
Qty: 90 TABLET | Refills: 3 | Status: SHIPPED | OUTPATIENT
Start: 2025-02-26

## 2025-02-26 RX ORDER — PRAVASTATIN SODIUM 80 MG/1
TABLET ORAL
Qty: 90 TABLET | Refills: 3 | Status: SHIPPED | OUTPATIENT
Start: 2025-02-26

## 2025-02-26 RX ORDER — CARVEDILOL 25 MG/1
25 TABLET ORAL 2 TIMES DAILY
Qty: 180 TABLET | Refills: 3 | Status: SHIPPED | OUTPATIENT
Start: 2025-02-26

## 2025-02-26 RX ORDER — POTASSIUM CHLORIDE 1500 MG/1
20 TABLET, EXTENDED RELEASE ORAL DAILY
Qty: 90 TABLET | Refills: 3 | Status: SHIPPED | OUTPATIENT
Start: 2025-02-26

## 2025-02-26 RX ORDER — LOSARTAN POTASSIUM 25 MG/1
25 TABLET ORAL DAILY
Qty: 90 TABLET | Refills: 3 | Status: SHIPPED | OUTPATIENT
Start: 2025-02-26

## 2025-02-26 NOTE — PROGRESS NOTES
CLINICAL STAFF DOCUMENTATION    Dr. Mike Sarmiento  1950  [id number]    Have you had any Chest Pain recently? - No        Have you had any Shortness of Breath - No      Have you had any dizziness - No  Have you had any palpitations recently? - No  Do you have any edema - swelling in ankles    If Yes - CHECK TO SEE IF THE EDEMA IS PITTING  How long have they been having edema - years   If Yes - Have they worn compression stockings No  If they have worn compression stockings               When did you have your last labs drawn 01/25  What doctor ordered n/a   Do we have the labs in their chart Yes  If we do not have the labs, ask where they were drawn [facility]    If we do not have these labs, you are retrieve these labs for the provider!    Do you have a surgery or procedure scheduled in the near future - No  CE    Do use tobacco products? - No  Do you drink alcohol? - No  Do you use any illicit drugs? - No  Caffeine? - Yes  How much caffeine? .1  cups coffee 2 glasses diet soda a wk       Check medication list thoroughly!!! AND RECONCILE OUTSIDE MEDICATIONS  If dose has changed change the entire order not just the MG  BE SURE TO ASK PATIENT IF THEY NEED MEDICATION REFILLS  Verify Pharmacy and update if incorrect    Add to every patient's \"wrap up\" the following dot phrase AFTERVISITCARDIOHEARTHOUSE and ensure we explain this to our patients

## 2025-02-26 NOTE — ASSESSMENT & PLAN NOTE
Blood pressure is well-controlled on current combination of antihypertensive therapy continue the same.

## 2025-02-26 NOTE — PROGRESS NOTES
Nazia Sarmiento  1950  Birdie Barber MD      Chief Complaint   Patient presents with    6 Month Follow-Up     Pt states states no new cardiac sx at this time.      Chief complaint and HPI:  Nazia Sarmiento  is a 74 y.o. female following up for hypertension and hyperlipidemia and has sick sinus syndrome status post pacemaker implantation.  Denies any cardiac symptoms.  She wants to know if her pacemaker is MRI compatible.  She also wants to know if she can donate her pacemaker upon her death to .  She is compliant to her medication medications reviewed and reconciled.    Rest of the Cardiovascular system review is otherwise unchanged from prior encounter.  Past medical history:  has a past medical history of Abnormal Holter monitor finding, Cardiac arrhythmia, DVT (deep venous thrombosis) ([facility]), Family history of cardiac dysrhythmia, H/O cardiovascular stress test, H/O cardiovascular stress test, H/O chest x-ray, H/O echocardiogram, Hiatal hernia with gastroesophageal reflux, History of complete ECG, History of sick sinus syndrome, HX OTHER MEDICAL, Hyperlipidemia, Hypertension, Obesity, Pacemaker, and Venous insufficiency.  Past surgical history:  has a past surgical history that includes Hysterectomy; Cardiac pacemaker placement (12/12/2003); Cardiac pacemaker placement (9/29/2011); joint replacement (5/12 & 9/12); and ep device procedure (N/A, 12/14/2023).  Social History:   Social History     Tobacco Use    Smoking status: Never    Smokeless tobacco: Never   Substance Use Topics    Alcohol use: No     Comment: caffeine 1 coffees a day -3 pops a wk     Family history: family history includes Arrhythmia in her father.  ALLERGIES:  Sulfa antibiotics    Prior to Admission medications    Medication Sig Start Date End Date Taking? Authorizing Provider   amLODIPine (NORVASC) 10 MG tablet Take 1 tablet by mouth daily 2/26/25  Yes Mike Dodge MD   carvedilol (COREG) 25 MG tablet Take 1 tablet by

## 2025-04-15 PROCEDURE — 93294 REM INTERROG EVL PM/LDLS PM: CPT | Performed by: INTERNAL MEDICINE

## 2025-04-15 PROCEDURE — 93296 REM INTERROG EVL PM/IDS: CPT | Performed by: INTERNAL MEDICINE

## 2025-07-16 DIAGNOSIS — I10 PRIMARY HYPERTENSION: ICD-10-CM

## 2025-07-16 RX ORDER — AMLODIPINE BESYLATE 10 MG/1
10 TABLET ORAL DAILY
Qty: 90 TABLET | Refills: 3 | Status: SHIPPED | OUTPATIENT
Start: 2025-07-16

## 2025-07-26 PROCEDURE — 93296 REM INTERROG EVL PM/IDS: CPT | Performed by: INTERNAL MEDICINE

## 2025-07-26 PROCEDURE — 93294 REM INTERROG EVL PM/LDLS PM: CPT | Performed by: INTERNAL MEDICINE

## 2025-08-27 ENCOUNTER — OFFICE VISIT (OUTPATIENT)
Dept: CARDIOLOGY CLINIC | Age: 75
End: 2025-08-27
Payer: MEDICARE

## 2025-08-27 VITALS
BODY MASS INDEX: 37.28 KG/M2 | WEIGHT: 232 LBS | OXYGEN SATURATION: 97 % | SYSTOLIC BLOOD PRESSURE: 122 MMHG | HEIGHT: 66 IN | DIASTOLIC BLOOD PRESSURE: 78 MMHG | RESPIRATION RATE: 16 BRPM | HEART RATE: 67 BPM

## 2025-08-27 DIAGNOSIS — Z95.0 PACEMAKER: Primary | ICD-10-CM

## 2025-08-27 DIAGNOSIS — I87.2 VENOUS INSUFFICIENCY: ICD-10-CM

## 2025-08-27 DIAGNOSIS — E78.00 PURE HYPERCHOLESTEROLEMIA: ICD-10-CM

## 2025-08-27 DIAGNOSIS — I10 PRIMARY HYPERTENSION: ICD-10-CM

## 2025-08-27 PROCEDURE — 3078F DIAST BP <80 MM HG: CPT | Performed by: INTERNAL MEDICINE

## 2025-08-27 PROCEDURE — 3074F SYST BP LT 130 MM HG: CPT | Performed by: INTERNAL MEDICINE

## 2025-08-27 PROCEDURE — 99214 OFFICE O/P EST MOD 30 MIN: CPT | Performed by: INTERNAL MEDICINE

## 2025-08-27 PROCEDURE — 1159F MED LIST DOCD IN RCRD: CPT | Performed by: INTERNAL MEDICINE

## 2025-08-27 PROCEDURE — 1123F ACP DISCUSS/DSCN MKR DOCD: CPT | Performed by: INTERNAL MEDICINE

## (undated) DEVICE — SUTURE VCRL SZ 2-0 L27IN ABSRB UD L26MM CT-2 1/2 CIR J269H

## (undated) DEVICE — SUTURE PERMA-HAND SZ 0 L30IN NONABSORBABLE BLK L36MM CT-1 424H

## (undated) DEVICE — Device